# Patient Record
Sex: FEMALE | Race: WHITE | Employment: OTHER | URBAN - METROPOLITAN AREA
[De-identification: names, ages, dates, MRNs, and addresses within clinical notes are randomized per-mention and may not be internally consistent; named-entity substitution may affect disease eponyms.]

---

## 2017-06-22 ENCOUNTER — OP HISTORICAL/CONVERTED ENCOUNTER (OUTPATIENT)
Dept: OTHER | Age: 82
End: 2017-06-22

## 2018-01-11 ENCOUNTER — OP HISTORICAL/CONVERTED ENCOUNTER (OUTPATIENT)
Dept: OTHER | Age: 83
End: 2018-01-11

## 2018-02-26 ENCOUNTER — OP HISTORICAL/CONVERTED ENCOUNTER (OUTPATIENT)
Dept: OTHER | Age: 83
End: 2018-02-26

## 2018-07-05 ENCOUNTER — OP HISTORICAL/CONVERTED ENCOUNTER (OUTPATIENT)
Dept: OTHER | Age: 83
End: 2018-07-05

## 2020-06-08 ENCOUNTER — OP HISTORICAL/CONVERTED ENCOUNTER (OUTPATIENT)
Dept: OTHER | Age: 85
End: 2020-06-08

## 2020-09-23 ENCOUNTER — HOSPITAL ENCOUNTER (OUTPATIENT)
Dept: PREADMISSION TESTING | Age: 85
Discharge: HOME | End: 2020-09-23
Payer: MEDICARE

## 2020-09-23 VITALS
HEART RATE: 69 BPM | RESPIRATION RATE: 16 BRPM | HEIGHT: 61 IN | WEIGHT: 96.2 LBS | DIASTOLIC BLOOD PRESSURE: 88 MMHG | SYSTOLIC BLOOD PRESSURE: 164 MMHG | OXYGEN SATURATION: 94 % | TEMPERATURE: 97.6 F | BODY MASS INDEX: 18.16 KG/M2

## 2020-09-23 LAB
ANION GAP SERPL CALC-SCNC: 6 MMOL/L (ref 5–15)
APTT PPP: 34.8 SEC (ref 23–35.7)
ATRIAL RATE: 60 BPM
BUN SERPL-MCNC: 14 MG/DL (ref 6–20)
BUN/CREAT SERPL: 19 (ref 12–20)
CA-I BLD-MCNC: 9.1 MG/DL (ref 8.5–10.1)
CALCULATED P AXIS, ECG09: 70 DEGREES
CALCULATED R AXIS, ECG10: -61 DEGREES
CALCULATED T AXIS, ECG11: 34 DEGREES
CHLORIDE SERPL-SCNC: 100 MMOL/L (ref 97–108)
CO2 SERPL-SCNC: 33 MMOL/L (ref 21–32)
CREAT SERPL-MCNC: 0.73 MG/DL (ref 0.55–1.02)
DIAGNOSIS, 93000: NORMAL
ERYTHROCYTE [DISTWIDTH] IN BLOOD BY AUTOMATED COUNT: 14.9 % (ref 11.5–14.5)
GLUCOSE SERPL-MCNC: 100 MG/DL (ref 65–100)
HCT VFR BLD AUTO: 40.2 % (ref 35–47)
HGB BLD-MCNC: 12.8 % (ref 11.5–16)
INR PPP: 1 (ref 0.9–1.1)
MCH RBC QN AUTO: 28.1 PG (ref 26–34)
MCHC RBC AUTO-ENTMCNC: 31.8 G/DL (ref 30–36.5)
MCV RBC AUTO: 88.4 FL (ref 80–99)
P-R INTERVAL, ECG05: 198 MS
PLATELET # BLD AUTO: 142 K/UL (ref 150–400)
PMV BLD AUTO: 10.4 FL (ref 8.9–12.9)
POTASSIUM SERPL-SCNC: 4 MMOL/L (ref 3.5–5.1)
PROTHROMBIN TIME: 13.7 SEC (ref 11.9–14.7)
Q-T INTERVAL, ECG07: 460 MS
QRS DURATION, ECG06: 86 MS
QTC CALCULATION (BEZET), ECG08: 460 MS
RBC # BLD AUTO: 4.55 M/UL (ref 3.8–5.2)
SODIUM SERPL-SCNC: 139 MMOL/L (ref 136–145)
THERAPEUTIC RANGE,PTTT: NORMAL SEC (ref 68–109)
VENTRICULAR RATE, ECG03: 60 BPM
WBC # BLD AUTO: 5.1 K/UL (ref 3.6–11)

## 2020-09-23 PROCEDURE — 36415 COLL VENOUS BLD VENIPUNCTURE: CPT

## 2020-09-23 PROCEDURE — 85730 THROMBOPLASTIN TIME PARTIAL: CPT

## 2020-09-23 PROCEDURE — 85610 PROTHROMBIN TIME: CPT

## 2020-09-23 PROCEDURE — 80048 BASIC METABOLIC PNL TOTAL CA: CPT

## 2020-09-23 PROCEDURE — 85027 COMPLETE CBC AUTOMATED: CPT

## 2020-09-23 PROCEDURE — 93005 ELECTROCARDIOGRAM TRACING: CPT

## 2020-09-23 RX ORDER — LANOLIN ALCOHOL/MO/W.PET/CERES
1000 CREAM (GRAM) TOPICAL DAILY
COMMUNITY

## 2020-09-23 RX ORDER — DIAPER,BRIEF,INFANT-TODD,DISP
EACH MISCELLANEOUS 2 TIMES DAILY
COMMUNITY

## 2020-09-23 RX ORDER — METOPROLOL SUCCINATE 25 MG/1
25 TABLET, EXTENDED RELEASE ORAL DAILY
COMMUNITY

## 2020-09-23 RX ORDER — LEVOTHYROXINE SODIUM 25 UG/1
50 TABLET ORAL
COMMUNITY

## 2020-09-23 RX ORDER — ASPIRIN 81 MG/1
81 TABLET ORAL DAILY
COMMUNITY

## 2020-09-23 RX ORDER — PANTOPRAZOLE SODIUM 20 MG/1
20 TABLET, DELAYED RELEASE ORAL DAILY
COMMUNITY

## 2020-09-27 ENCOUNTER — HOSPITAL ENCOUNTER (OUTPATIENT)
Dept: PREADMISSION TESTING | Age: 85
Discharge: HOME | End: 2020-09-27
Payer: MEDICARE

## 2020-09-27 LAB — SARS-COV-2, COV2: NORMAL

## 2020-09-27 PROCEDURE — 87635 SARS-COV-2 COVID-19 AMP PRB: CPT

## 2020-10-01 ENCOUNTER — HOSPITAL ENCOUNTER (OUTPATIENT)
Age: 85
Discharge: HOME | End: 2020-10-01
Attending: INTERNAL MEDICINE | Admitting: INTERNAL MEDICINE
Payer: MEDICARE

## 2020-10-01 VITALS
TEMPERATURE: 97.9 F | SYSTOLIC BLOOD PRESSURE: 130 MMHG | DIASTOLIC BLOOD PRESSURE: 67 MMHG | OXYGEN SATURATION: 96 % | RESPIRATION RATE: 18 BRPM | HEART RATE: 89 BPM

## 2020-10-01 DIAGNOSIS — Z45.010 PACEMAKER GENERATOR END OF LIFE: ICD-10-CM

## 2020-10-01 PROBLEM — Z45.018 ADJUSTMENT AND MANAGEMENT OF CARDIAC PACEMAKER: Status: ACTIVE | Noted: 2020-10-01

## 2020-10-01 LAB — SARS-COV-2, COV2NT: NOT DETECTED

## 2020-10-01 PROCEDURE — C1786 PMKR, SINGLE, RATE-RESP: HCPCS | Performed by: INTERNAL MEDICINE

## 2020-10-01 PROCEDURE — 74011000250 HC RX REV CODE- 250: Performed by: INTERNAL MEDICINE

## 2020-10-01 PROCEDURE — 76210000006 HC OR PH I REC 0.5 TO 1 HR: Performed by: INTERNAL MEDICINE

## 2020-10-01 PROCEDURE — 74011250636 HC RX REV CODE- 250/636: Performed by: INTERNAL MEDICINE

## 2020-10-01 PROCEDURE — 77030022017 HC DRSG HEMO QCLOT ZMED -A: Performed by: INTERNAL MEDICINE

## 2020-10-01 PROCEDURE — 33212 INSERT PULSE GEN SNGL LEAD: CPT | Performed by: INTERNAL MEDICINE

## 2020-10-01 PROCEDURE — 74011000272 HC RX REV CODE- 272: Performed by: INTERNAL MEDICINE

## 2020-10-01 PROCEDURE — 77030028700 HC BLD TISS PLSM MEDT -E: Performed by: INTERNAL MEDICINE

## 2020-10-01 PROCEDURE — 99153 MOD SED SAME PHYS/QHP EA: CPT | Performed by: INTERNAL MEDICINE

## 2020-10-01 PROCEDURE — 77030003028 HC SUT VCRL J&J -A: Performed by: INTERNAL MEDICINE

## 2020-10-01 PROCEDURE — 74011000258 HC RX REV CODE- 258: Performed by: INTERNAL MEDICINE

## 2020-10-01 PROCEDURE — 74011250637 HC RX REV CODE- 250/637: Performed by: INTERNAL MEDICINE

## 2020-10-01 PROCEDURE — 99152 MOD SED SAME PHYS/QHP 5/>YRS: CPT | Performed by: INTERNAL MEDICINE

## 2020-10-01 PROCEDURE — 76210000020 HC REC RM PH II FIRST 0.5 HR: Performed by: INTERNAL MEDICINE

## 2020-10-01 PROCEDURE — 77030031139 HC SUT VCRL2 J&J -A: Performed by: INTERNAL MEDICINE

## 2020-10-01 DEVICE — IMPLANTABLE DEVICE: Type: IMPLANTABLE DEVICE | Status: FUNCTIONAL

## 2020-10-01 RX ORDER — LIDOCAINE HYDROCHLORIDE 20 MG/ML
INJECTION, SOLUTION INFILTRATION; PERINEURAL AS NEEDED
Status: DISCONTINUED | OUTPATIENT
Start: 2020-10-01 | End: 2020-10-01 | Stop reason: HOSPADM

## 2020-10-01 RX ORDER — FENTANYL CITRATE 50 UG/ML
INJECTION, SOLUTION INTRAMUSCULAR; INTRAVENOUS AS NEEDED
Status: DISCONTINUED | OUTPATIENT
Start: 2020-10-01 | End: 2020-10-01 | Stop reason: HOSPADM

## 2020-10-01 RX ORDER — SODIUM CHLORIDE 9 MG/ML
20 INJECTION, SOLUTION INTRAVENOUS CONTINUOUS
Status: DISCONTINUED | OUTPATIENT
Start: 2020-10-01 | End: 2020-10-01 | Stop reason: HOSPADM

## 2020-10-01 RX ORDER — MIDAZOLAM HYDROCHLORIDE 1 MG/ML
INJECTION, SOLUTION INTRAMUSCULAR; INTRAVENOUS AS NEEDED
Status: DISCONTINUED | OUTPATIENT
Start: 2020-10-01 | End: 2020-10-01 | Stop reason: HOSPADM

## 2020-10-01 RX ADMIN — SODIUM CHLORIDE 20 ML/HR: 9 INJECTION, SOLUTION INTRAVENOUS at 08:00

## 2020-10-01 NOTE — DISCHARGE INSTRUCTIONS
Patient Education        Pacemaker or ICD Replacement: What to Expect at [address] [facility]     Pacemaker or implantable cardioverter-defibrillator (ICD) replacement is surgery to put a new heart device in your chest. The battery in your new device is fully charged. It should last for several years. Your doctor may have also replaced the wires (leads) from the device to your heart, if needed. Your chest may be sore where the doctor made the cut (incision) and put in the device. You also may have a bruise and mild swelling. These symptoms usually get better in 1 to 2 weeks. You may feel a hard ridge along the incision. This usually gets softer in the months after surgery. You may be able to see or feel the outline of the device under your skin. You will probably be able to go back to work or your usual routine within 1 week after surgery. It may take as long as 2 weeks if your leads were also replaced. This care sheet gives you a general idea about how long it will take for you to recover. But each person recovers at a different pace. Follow the steps below to get better as quickly as possible. How can you care for yourself at home? Activity    · Rest when you feel tired.     · Be active. Walking is a good choice.     · If you also had your leads replaced, then for about 2 weeks:  ? Avoid activities that strain your chest or upper arm muscles. This includes pushing a  and mopping floors. It also includes swimming and swinging a golf club. ? Do not raise your arm (the one on the side of your body where the device is located) above your shoulder.     · Allow your body to heal. Don't move quickly or lift anything heavy until you are feeling better.     · Ask your doctor when it is okay for you to have sex. Diet    · You can eat your normal diet. If your stomach is upset, try bland, low-fat foods like plain rice, broiled chicken, toast, and yogurt.    Medicines    · Your doctor will tell you if and when you can restart your medicines. He or she will also give you instructions about taking any new medicines.     · If you take aspirin or some other blood thinner, ask your doctor if and when to start taking it again. Make sure that you understand exactly what your doctor wants you to do.     · Be safe with medicines. Read and follow all instructions on the label. ? If the doctor gave you a prescription medicine for pain, take it as prescribed. ? If you are not taking a prescription pain medicine, ask your doctor if you can take an over-the-counter medicine.     · If your doctor prescribed antibiotics, take them as directed. Do not stop taking them just because you feel better. You need to take the full course of antibiotics. Incision care    · If you have strips of tape on the cut (incision) the doctor made, leave the tape on for a week or until it falls off.     · You may shower 24 to 48 hours after surgery. Pat the incision dry. Don't swim or take a bath for the first 2 weeks, or until your doctor tells you it is okay.     · You will have a dressing over the incision. A dressing helps the incision heal and protects it. Your doctor will tell you how to take care of this. Other instructions    · Keep a medical ID card with you at all times that says you have a heart device. You'll get an updated one with information about your new device. The card should include the  and model information.     · Wear medical alert jewelry stating that you have the device. You can buy this at most drugstores.     · Check your pulse as directed by your doctor.     · Have your device checked as often as your doctor recommends. In some cases, this may be done over the phone or online. Your doctor will give you instructions about how to do this. Follow-up care is a key part of your treatment and safety. Be sure to make and go to all appointments, and call your doctor if you are having problems.  It's also a good idea to know your test results and keep a list of the medicines you take. When should you call for help? Call 911 anytime you think you may need emergency care. For example, call if:    · You passed out (lost consciousness).     · You have trouble breathing. Call your doctor now or seek immediate medical care if:    · You are dizzy or lightheaded, or you feel like you may faint.     · You have pain that does not get better after you take pain medicine.     · You hear an alarm or feel a vibration from your heart device.     · You have loose stitches, or your incision comes open.     · Bright red blood has soaked through the bandage over your incision.     · You have signs of infection, such as:  ? Increased pain, swelling, warmth, or redness. ? Red streaks leading from the incision. ? Pus draining from the incision. ? A fever. Watch closely for changes in your health, and be sure to contact your doctor if:    · You have any problems with your device. Current as of: December 16, 2019               Content Version: 12.6  © 6191-4978 [employer]. Care instructions adapted under license by [employer] (which disclaims liability or warranty for this information). If you have questions about a medical condition or this instruction, always ask your healthcare professional. [place] [address] any warranty or liability for your use of this information. Patient Education        Pacemaker or ICD Replacement: What to Expect at [address] [place] [address]     Pacemaker or implantable cardioverter-defibrillator (ICD) replacement is surgery to put a new heart device in your chest. The battery in your new device is fully charged. It should last for several years. Your doctor may have also replaced the wires (leads) from the device to your heart, if needed. Your chest may be sore where the doctor made the cut (incision) and put in the device. You also may have a bruise and mild swelling. These symptoms usually get better in 1 to 2 weeks. You may feel a hard ridge along the incision. This usually gets softer in the months after surgery. You may be able to see or feel the outline of the device under your skin. You will probably be able to go back to work or your usual routine within 1 week after surgery. It may take as long as 2 weeks if your leads were also replaced. This care sheet gives you a general idea about how long it will take for you to recover. But each person recovers at a different pace. Follow the steps below to get better as quickly as possible. How can you care for yourself at home? Activity    · Rest when you feel tired.     · Be active. Walking is a good choice.     · If you also had your leads replaced, then for about 2 weeks:  ? Avoid activities that strain your chest or upper arm muscles. This includes pushing a  and mopping floors. It also includes swimming and swinging a golf club. ? Do not raise your arm (the one on the side of your body where the device is located) above your shoulder.     · Allow your body to heal. Don't move quickly or lift anything heavy until you are feeling better.     · Ask your doctor when it is okay for you to have sex. Diet    · You can eat your normal diet. If your stomach is upset, try bland, low-fat foods like plain rice, broiled chicken, toast, and yogurt. Medicines    · Your doctor will tell you if and when you can restart your medicines. He or she will also give you instructions about taking any new medicines.     · If you take aspirin or some other blood thinner, ask your doctor if and when to start taking it again. Make sure that you understand exactly what your doctor wants you to do.     · Be safe with medicines. Read and follow all instructions on the label. ? If the doctor gave you a prescription medicine for pain, take it as prescribed.   ? If you are not taking a prescription pain medicine, ask your doctor if you can take an over-the-counter medicine.     · If your doctor prescribed antibiotics, take them as directed. Do not stop taking them just because you feel better. You need to take the full course of antibiotics. Incision care    · If you have strips of tape on the cut (incision) the doctor made, leave the tape on for a week or until it falls off.     · You may shower 24 to 48 hours after surgery. Pat the incision dry. Don't swim or take a bath for the first 2 weeks, or until your doctor tells you it is okay.     · You will have a dressing over the incision. A dressing helps the incision heal and protects it. Your doctor will tell you how to take care of this. Other instructions    · Keep a medical ID card with you at all times that says you have a heart device. You'll get an updated one with information about your new device. The card should include the  and model information.     · Wear medical alert jewelry stating that you have the device. You can buy this at most drugstores.     · Check your pulse as directed by your doctor.     · Have your device checked as often as your doctor recommends. In some cases, this may be done over the phone or online. Your doctor will give you instructions about how to do this. Follow-up care is a key part of your treatment and safety. Be sure to make and go to all appointments, and call your doctor if you are having problems. It's also a good idea to know your test results and keep a list of the medicines you take. When should you call for help? Call 911 anytime you think you may need emergency care. For example, call if:    · You passed out (lost consciousness).     · You have trouble breathing.    Call your doctor now or seek immediate medical care if:    · You are dizzy or lightheaded, or you feel like you may faint.     · You have pain that does not get better after you take pain medicine.     · You hear an alarm or feel a vibration from your heart device.     · You have loose stitches, or your incision comes open.     · Bright red blood has soaked through the bandage over your incision.     · You have signs of infection, such as:  ? Increased pain, swelling, warmth, or redness. ? Red streaks leading from the incision. ? Pus draining from the incision. ? A fever. Watch closely for changes in your health, and be sure to contact your doctor if:    · You have any problems with your device. Current as of: December 16, 2019               Content Version: 12.6  © 2006-2020 [employer]. Care instructions adapted under license by [employer] (which disclaims liability or warranty for this information). If you have questions about a medical condition or this instruction, always ask your healthcare professional. [address] any warranty or liability for your use of this information.

## 2020-10-01 NOTE — PROCEDURES
NAME: Yessenia Gordon   :  9/3/1919   MRN:  [id number]   Date/Time:  10/1/2020 10:42 AM        Indication: Pacemaker at [employer] replacement    PROCEDURES PERFORMED:  1. Conscious sedation. 2. REMOVAL OF Cardiac Rhythm Device Betsy Early [id number] (DOI, 1-)  3. Permanent [facility] [id number], [id number] Device  Implantation:        COMPLICATIONS:None. ESTIMATED BLOOD LOSS: Minimal  SPECIMENS: None  ASSISTANTS: See Gaby    PROCEDURAL NOTE:  The patient was brought to the laboratory in a sedated postabsorptive state. Conscious sedation was administered by nursing staff under my supervision. The left chest wall was prepped and draped in the usual fashion and anesthetized with 20 mL of 2% lidocaine. Incision was made over the deltopectoral groove and extended to the level of the pectoralis fascia. A pocket was opened and the cardiac rhythm device was exposed and removed. The leads were inspected and noted to be visually intact. The leads were tested and thresholds were acceptable. The device pocket was flushed with antibiotic containing sterile saline  solution. The leads were then attached to generator. Leads and generator placed in the pocket with the leads posterior to the generator. Subcutaneous tissue closed in 2 layers utilizing #2-0 Vicryl. Skin closed with sterisrtips with an excellent final result. The patient was transferred to the holding area    No complications were noted. Procedure Time: 10 AM  -10.45AM    Anesthesia time: 10.10 AM-10.45 AM, IV Versed and Fentanyl used under my supervision    PACEMAKER THRESHOLD TESTIN. Ventricular R-wave:12.5millivolts; Ventricular capture threshold ( 0.4 milliseconds pulse width): Voltage ). 75 volts, impedance 626 ohms,  Atrial P-wave: 3.5 millivolts;  Atrial capture threshold ( 0.4milliseconds pulse width): Voltage 0.5 volts, impedance 560 ohms,

## 2020-10-01 NOTE — Clinical Note
A Bovie was used. Blend setting: blend. Coagulation Settin. Cut Settin. Site (pad location): upper leg. Laterality: right.

## 2020-10-01 NOTE — PROGRESS NOTES
Patient and daughter verbalized understanding of discharge instructions. Patient ambulating with cane. Sling applied to left arm. Nitro-pasted removed. Discharged via wheelchair.

## 2020-10-01 NOTE — Clinical Note
Contrast Dose Calculator:   Patient's age: 80. Patient's sex: Female. Patient weight (kg) = 46. Creatinine level (mg/dL) = 0.76. Creatinine clearance (mL/min): 28.   Max Contrast dose per Creatinine Cl calculator = 63 mL.

## 2020-10-01 NOTE — ROUTINE PROCESS
Dr. Mica Styles was notified that Bacitracin irrigation no longer available, EP lab notified also. Per pharmacy no longer available and this is per new recommendations.

## 2022-04-22 ENCOUNTER — HOSPITAL ENCOUNTER (INPATIENT)
Age: 87
LOS: 3 days | Discharge: HOME | DRG: 280 | End: 2022-04-25
Attending: EMERGENCY MEDICINE | Admitting: INTERNAL MEDICINE
Payer: MEDICARE

## 2022-04-22 ENCOUNTER — APPOINTMENT (OUTPATIENT)
Dept: GENERAL RADIOLOGY | Age: 87
DRG: 280 | End: 2022-04-22
Attending: EMERGENCY MEDICINE
Payer: MEDICARE

## 2022-04-22 DIAGNOSIS — I48.91 ATRIAL FIBRILLATION WITH RAPID VENTRICULAR RESPONSE (HCC): Primary | ICD-10-CM

## 2022-04-22 DIAGNOSIS — I50.9 CONGESTIVE HEART FAILURE, UNSPECIFIED HF CHRONICITY, UNSPECIFIED HEART FAILURE TYPE (HCC): ICD-10-CM

## 2022-04-22 DIAGNOSIS — R77.8 ELEVATED TROPONIN: ICD-10-CM

## 2022-04-22 LAB
ALBUMIN SERPL-MCNC: 3.9 G/DL (ref 3.5–5)
ALBUMIN/GLOB SERPL: 1.1 {RATIO} (ref 1.1–2.2)
ALP SERPL-CCNC: 76 U/L (ref 45–117)
ALT SERPL-CCNC: 51 U/L (ref 12–78)
ANION GAP SERPL CALC-SCNC: 3 MMOL/L (ref 5–15)
APTT PPP: 33.6 SEC (ref 21.2–34.1)
AST SERPL W P-5'-P-CCNC: 54 U/L (ref 15–37)
ATRIAL RATE: 159 BPM
ATRIAL RATE: 278 BPM
BASOPHILS # BLD: 0 K/UL (ref 0–0.1)
BASOPHILS NFR BLD: 0 % (ref 0–1)
BILIRUB SERPL-MCNC: 1.1 MG/DL (ref 0.2–1)
BNP SERPL-MCNC: ABNORMAL PG/ML
BUN SERPL-MCNC: 22 MG/DL (ref 6–20)
BUN/CREAT SERPL: 27 (ref 12–20)
CA-I BLD-MCNC: 9.3 MG/DL (ref 8.5–10.1)
CALCULATED R AXIS, ECG10: -58 DEGREES
CALCULATED R AXIS, ECG10: -70 DEGREES
CALCULATED T AXIS, ECG11: -74 DEGREES
CALCULATED T AXIS, ECG11: 88 DEGREES
CHLORIDE SERPL-SCNC: 103 MMOL/L (ref 97–108)
CO2 SERPL-SCNC: 30 MMOL/L (ref 21–32)
CREAT SERPL-MCNC: 0.83 MG/DL (ref 0.55–1.02)
DIAGNOSIS, 93000: NORMAL
DIAGNOSIS, 93000: NORMAL
DIFFERENTIAL METHOD BLD: ABNORMAL
EOSINOPHIL # BLD: 0 K/UL (ref 0–0.4)
EOSINOPHIL NFR BLD: 0 % (ref 0–7)
ERYTHROCYTE [DISTWIDTH] IN BLOOD BY AUTOMATED COUNT: 13.5 % (ref 11.5–14.5)
GLOBULIN SER CALC-MCNC: 3.4 G/DL (ref 2–4)
GLUCOSE SERPL-MCNC: 138 MG/DL (ref 65–100)
HCT VFR BLD AUTO: 41.1 % (ref 35–47)
HGB BLD-MCNC: 13.5 G/DL (ref 11.5–16)
IMM GRANULOCYTES # BLD AUTO: 0.1 K/UL (ref 0–0.04)
IMM GRANULOCYTES NFR BLD AUTO: 1 % (ref 0–0.5)
INR PPP: 1.1 (ref 0.9–1.1)
LACTATE SERPL-SCNC: 2 MMOL/L (ref 0.4–2)
LYMPHOCYTES # BLD: 0.4 K/UL (ref 0.8–3.5)
LYMPHOCYTES NFR BLD: 3 % (ref 12–49)
MCH RBC QN AUTO: 30.8 PG (ref 26–34)
MCHC RBC AUTO-ENTMCNC: 32.8 G/DL (ref 30–36.5)
MCV RBC AUTO: 93.6 FL (ref 80–99)
MONOCYTES # BLD: 1.6 K/UL (ref 0–1)
MONOCYTES NFR BLD: 13 % (ref 5–13)
NEUTS SEG # BLD: 10.3 K/UL (ref 1.8–8)
NEUTS SEG NFR BLD: 83 % (ref 32–75)
NRBC # BLD: 0 K/UL (ref 0–0.01)
NRBC BLD-RTO: 0 PER 100 WBC
PLATELET # BLD AUTO: 169 K/UL (ref 150–400)
PMV BLD AUTO: 11.6 FL (ref 8.9–12.9)
POTASSIUM SERPL-SCNC: 4.6 MMOL/L (ref 3.5–5.1)
PROT SERPL-MCNC: 7.3 G/DL (ref 6.4–8.2)
PROTHROMBIN TIME: 14.5 SEC (ref 11.9–14.6)
Q-T INTERVAL, ECG07: 314 MS
Q-T INTERVAL, ECG07: 382 MS
QRS DURATION, ECG06: 86 MS
QRS DURATION, ECG06: 90 MS
QTC CALCULATION (BEZET), ECG08: 485 MS
QTC CALCULATION (BEZET), ECG08: 494 MS
RBC # BLD AUTO: 4.39 M/UL (ref 3.8–5.2)
SODIUM SERPL-SCNC: 136 MMOL/L (ref 136–145)
THERAPEUTIC RANGE,PTTT: NORMAL SEC (ref 82–109)
TROPONIN-HIGH SENSITIVITY: 157 NG/L (ref 0–51)
TROPONIN-HIGH SENSITIVITY: 159 NG/L (ref 0–51)
VENTRICULAR RATE, ECG03: 149 BPM
VENTRICULAR RATE, ECG03: 97 BPM
WBC # BLD AUTO: 12.4 K/UL (ref 3.6–11)

## 2022-04-22 PROCEDURE — 74011250637 HC RX REV CODE- 250/637: Performed by: INTERNAL MEDICINE

## 2022-04-22 PROCEDURE — 36415 COLL VENOUS BLD VENIPUNCTURE: CPT

## 2022-04-22 PROCEDURE — 83605 ASSAY OF LACTIC ACID: CPT

## 2022-04-22 PROCEDURE — 74011000250 HC RX REV CODE- 250: Performed by: EMERGENCY MEDICINE

## 2022-04-22 PROCEDURE — 85730 THROMBOPLASTIN TIME PARTIAL: CPT

## 2022-04-22 PROCEDURE — 96375 TX/PRO/DX INJ NEW DRUG ADDON: CPT

## 2022-04-22 PROCEDURE — 99285 EMERGENCY DEPT VISIT HI MDM: CPT

## 2022-04-22 PROCEDURE — 80053 COMPREHEN METABOLIC PANEL: CPT

## 2022-04-22 PROCEDURE — 85025 COMPLETE CBC W/AUTO DIFF WBC: CPT

## 2022-04-22 PROCEDURE — 87040 BLOOD CULTURE FOR BACTERIA: CPT

## 2022-04-22 PROCEDURE — 71045 X-RAY EXAM CHEST 1 VIEW: CPT

## 2022-04-22 PROCEDURE — 74011000250 HC RX REV CODE- 250: Performed by: INTERNAL MEDICINE

## 2022-04-22 PROCEDURE — 74011250636 HC RX REV CODE- 250/636: Performed by: EMERGENCY MEDICINE

## 2022-04-22 PROCEDURE — 83880 ASSAY OF NATRIURETIC PEPTIDE: CPT

## 2022-04-22 PROCEDURE — 93005 ELECTROCARDIOGRAM TRACING: CPT

## 2022-04-22 PROCEDURE — 84484 ASSAY OF TROPONIN QUANT: CPT

## 2022-04-22 PROCEDURE — 96374 THER/PROPH/DIAG INJ IV PUSH: CPT

## 2022-04-22 PROCEDURE — 65270000029 HC RM PRIVATE

## 2022-04-22 PROCEDURE — 85610 PROTHROMBIN TIME: CPT

## 2022-04-22 RX ORDER — HEPARIN SODIUM 1000 [USP'U]/ML
60 INJECTION, SOLUTION INTRAVENOUS; SUBCUTANEOUS AS NEEDED
Status: DISCONTINUED | OUTPATIENT
Start: 2022-04-22 | End: 2022-04-23

## 2022-04-22 RX ORDER — FUROSEMIDE 10 MG/ML
40 INJECTION INTRAMUSCULAR; INTRAVENOUS
Status: COMPLETED | OUTPATIENT
Start: 2022-04-22 | End: 2022-04-22

## 2022-04-22 RX ORDER — SODIUM CHLORIDE 0.9 % (FLUSH) 0.9 %
5-40 SYRINGE (ML) INJECTION EVERY 8 HOURS
Status: DISCONTINUED | OUTPATIENT
Start: 2022-04-22 | End: 2022-04-25 | Stop reason: HOSPADM

## 2022-04-22 RX ORDER — HEPARIN SODIUM 1000 [USP'U]/ML
30 INJECTION, SOLUTION INTRAVENOUS; SUBCUTANEOUS AS NEEDED
Status: DISCONTINUED | OUTPATIENT
Start: 2022-04-22 | End: 2022-04-23

## 2022-04-22 RX ORDER — LEVOTHYROXINE SODIUM 25 UG/1
50 TABLET ORAL
Status: DISCONTINUED | OUTPATIENT
Start: 2022-04-23 | End: 2022-04-25 | Stop reason: HOSPADM

## 2022-04-22 RX ORDER — ACETAMINOPHEN 650 MG/1
650 SUPPOSITORY RECTAL
Status: DISCONTINUED | OUTPATIENT
Start: 2022-04-22 | End: 2022-04-25 | Stop reason: HOSPADM

## 2022-04-22 RX ORDER — LANOLIN ALCOHOL/MO/W.PET/CERES
1000 CREAM (GRAM) TOPICAL DAILY
Status: DISCONTINUED | OUTPATIENT
Start: 2022-04-23 | End: 2022-04-25 | Stop reason: HOSPADM

## 2022-04-22 RX ORDER — ONDANSETRON 2 MG/ML
4 INJECTION INTRAMUSCULAR; INTRAVENOUS
Status: DISCONTINUED | OUTPATIENT
Start: 2022-04-22 | End: 2022-04-25 | Stop reason: HOSPADM

## 2022-04-22 RX ORDER — PANTOPRAZOLE SODIUM 20 MG/1
20 TABLET, DELAYED RELEASE ORAL DAILY
Status: DISCONTINUED | OUTPATIENT
Start: 2022-04-23 | End: 2022-04-25 | Stop reason: HOSPADM

## 2022-04-22 RX ORDER — HYDROCORTISONE 10 MG/ML
1 LOTION TOPICAL 2 TIMES DAILY
Status: DISCONTINUED | OUTPATIENT
Start: 2022-04-22 | End: 2022-04-25 | Stop reason: HOSPADM

## 2022-04-22 RX ORDER — LORATADINE 10 MG/1
10 TABLET ORAL DAILY
Status: DISCONTINUED | OUTPATIENT
Start: 2022-04-23 | End: 2022-04-25 | Stop reason: HOSPADM

## 2022-04-22 RX ORDER — ASPIRIN 81 MG/1
81 TABLET ORAL DAILY
Status: DISCONTINUED | OUTPATIENT
Start: 2022-04-23 | End: 2022-04-23

## 2022-04-22 RX ORDER — FUROSEMIDE 10 MG/ML
40 INJECTION INTRAMUSCULAR; INTRAVENOUS DAILY
Status: DISCONTINUED | OUTPATIENT
Start: 2022-04-23 | End: 2022-04-23

## 2022-04-22 RX ORDER — DILTIAZEM HCL/D5W 125 MG/125
10 PLASTIC BAG, INJECTION (ML) INTRAVENOUS
Status: DISCONTINUED | OUTPATIENT
Start: 2022-04-22 | End: 2022-04-23

## 2022-04-22 RX ORDER — POLYETHYLENE GLYCOL 3350 17 G/17G
17 POWDER, FOR SOLUTION ORAL DAILY PRN
Status: DISCONTINUED | OUTPATIENT
Start: 2022-04-22 | End: 2022-04-25 | Stop reason: HOSPADM

## 2022-04-22 RX ORDER — ACETAMINOPHEN 325 MG/1
650 TABLET ORAL
Status: DISCONTINUED | OUTPATIENT
Start: 2022-04-22 | End: 2022-04-25 | Stop reason: HOSPADM

## 2022-04-22 RX ORDER — ONDANSETRON 4 MG/1
4 TABLET, ORALLY DISINTEGRATING ORAL
Status: DISCONTINUED | OUTPATIENT
Start: 2022-04-22 | End: 2022-04-25 | Stop reason: HOSPADM

## 2022-04-22 RX ORDER — DILTIAZEM HYDROCHLORIDE 5 MG/ML
10 INJECTION INTRAVENOUS
Status: COMPLETED | OUTPATIENT
Start: 2022-04-22 | End: 2022-04-22

## 2022-04-22 RX ORDER — METOPROLOL SUCCINATE 25 MG/1
25 TABLET, EXTENDED RELEASE ORAL DAILY
Status: DISCONTINUED | OUTPATIENT
Start: 2022-04-23 | End: 2022-04-25 | Stop reason: HOSPADM

## 2022-04-22 RX ORDER — HEPARIN SODIUM 10000 [USP'U]/100ML
12-25 INJECTION, SOLUTION INTRAVENOUS CONTINUOUS
Status: DISCONTINUED | OUTPATIENT
Start: 2022-04-22 | End: 2022-04-23

## 2022-04-22 RX ORDER — ENOXAPARIN SODIUM 100 MG/ML
30 INJECTION SUBCUTANEOUS DAILY
Status: DISCONTINUED | OUTPATIENT
Start: 2022-04-23 | End: 2022-04-22

## 2022-04-22 RX ORDER — SODIUM CHLORIDE 0.9 % (FLUSH) 0.9 %
5-40 SYRINGE (ML) INJECTION AS NEEDED
Status: DISCONTINUED | OUTPATIENT
Start: 2022-04-22 | End: 2022-04-25 | Stop reason: HOSPADM

## 2022-04-22 RX ADMIN — Medication 5 MG/HR: at 11:37

## 2022-04-22 RX ADMIN — DILTIAZEM HYDROCHLORIDE 10 MG: 5 INJECTION INTRAVENOUS at 11:32

## 2022-04-22 RX ADMIN — CEFTRIAXONE SODIUM 1 G: 1 INJECTION, POWDER, FOR SOLUTION INTRAMUSCULAR; INTRAVENOUS at 11:43

## 2022-04-22 RX ADMIN — ACETAMINOPHEN 650 MG: 325 TABLET ORAL at 22:47

## 2022-04-22 RX ADMIN — SODIUM CHLORIDE, PRESERVATIVE FREE 10 ML: 5 INJECTION INTRAVENOUS at 15:42

## 2022-04-22 RX ADMIN — FUROSEMIDE 40 MG: 10 INJECTION INTRAMUSCULAR; INTRAVENOUS at 12:48

## 2022-04-22 RX ADMIN — SODIUM CHLORIDE, PRESERVATIVE FREE 10 ML: 5 INJECTION INTRAVENOUS at 21:48

## 2022-04-22 NOTE — PROGRESS NOTES
Reason for Admission:  SOB                     RUR Score:   N/A                  Plan for utilizing home health:    Not at this time      PCP: First and Last name:  Jonathon Jacobo MD     Name of Practice:    Are you a current patient: Yes/No:    Approximate date of last visit:  Few months ago   Can you participate in a virtual visit with your PCP:                     Current Advanced Directive/Advance Care Plan: DNR      Healthcare Decision Maker:   Click here to complete [place] including selection of the Healthcare Decision Maker Relationship (ie \"Primary\")     Sharlene Hoff, daughter, [phone]                        Transition of Care Plan:      Met f/f with Pt, she confirmed that the information on the face sheet is correct. Pt stated that she lives by herself. Pt daughter stated that she has people in her house all the time. Pt stated that she has a cane, no HH and is independent with ADL. Pt stated that she uses [facility]. [facility] Muhammad for her Pharmacy. Pt stated that one of her kids will give her a ride home when she is D/C.     CM DISPO: Home maybe with HH, TBD

## 2022-04-22 NOTE — H&P
History & Physical    Primary Care Provider: Kenia Anderson MD  Source of Information: Patient     History of Presenting Illness:   Jose J Serrano is a 80 y.o. female who presents with complaints of progressive shortness of breath for the past few days. Notes intermittent chest palpitation without dizziness lightheadedness or falls. Evaluated in the ED and noted with atrial fibrillation with rapid ventricular response. Started on IV Cardizem with improvement in heart rate. BNP was 16,000. Chest x-ray with pulmonary edema. Will be admitted for further cardiovascular evaluation. Review of Systems:  A comprehensive review of systems was negative except for that written in the History of Present Illness. Past Medical History:   Diagnosis Date    Anxiety     Breast cancer ([facility] [address] 1980    right mastectomy    GI bleed     High cholesterol     Hypertension     Hypothyroid     Pacemaker     Sick sinus syndrome ([facility] [address]     [facility]roke [facility])       Past Surgical History:   Procedure Laterality Date    HX COLONOSCOPY      HX ENDOSCOPY      HX HEMORRHOIDECTOMY      HX HYSTERECTOMY      HX PACEMAKER      HX PACEMAKER PLACEMENT      NE BREAST SURGERY PROCEDURE UNLISTED      right mastectomy    NE INS PM PLS GEN W/EXIST SINGLE LEAD N/A 10/1/2020    INSERT PPM SINGLE LEAD GEN ONLY performed by Flavia Taylor MD at [address] [place] [address]     Prior to Admission medications    Medication Sig Start Date End Date Taking? Authorizing Provider   hydrocortisone (CORTAID) 0.5 % topical cream Apply  to affected area two (2) times a day. use thin layer    Provider, Historical   loratadine 10 mg cap Take  by mouth. Provider, Historical   pantoprazole (PROTONIX) 20 mg tablet Take 20 mg by mouth daily. Provider, Historical   cyanocobalamin 1,000 mcg tablet Take 1,000 mcg by mouth daily.     Provider, Historical   metoprolol succinate (TOPROL-XL) 25 mg XL tablet Take 25 mg by mouth daily. Provider, Historical   aspirin delayed-release 81 mg tablet Take 81 mg by mouth daily. Provider, Historical   levothyroxine (synthroid) 25 mcg tablet Take 50 mcg by mouth Daily (before breakfast). Provider, Historical     No Known Allergies   History reviewed. No pertinent family history. SOCIAL HISTORY:  Patient resides:  Independently    Assisted Living    SNF    With family care x      Smoking history:   None x   Former    Chronic      Alcohol history:   None x   Social    Chronic      Ambulates:   Independently    w/cane x   w/walker    w/wc    CODE STATUS:  DNR    Full x   Other      Objective:     Physical Exam:     Visit Vitals  BP (!) 134/90   Pulse 98   Temp 97.5 °F (36.4 °C)   Resp 21   Ht 5' 3\" (1.6 m)   Wt 45.4 kg (100 lb)   SpO2 93%   BMI 17.71 kg/m²    O2 Flow Rate (L/min): 2 l/min O2 Device: Nasal cannula    General:  Alert, cooperative, no distress, appears stated age. Head:  Normocephalic, without obvious abnormality, atraumatic. Eyes:  Conjunctivae/corneas clear. PERRL, EOMs intact. Nose: Nares normal. Septum midline. Mucosa normal. No drainage or sinus tenderness. Throat: Lips, mucosa, and tongue normal. Teeth and gums normal.   Neck: Supple, symmetrical, trachea midline, no adenopathy, thyroid: no enlargement/tenderness/nodules, no carotid bruit and no JVD. Back:   Symmetric, no curvature. ROM normal. No CVA tenderness. Lungs:   Clear to auscultation bilaterally. Chest wall:  No tenderness or deformity. Heart:  IRRegular rate and rhythm, S1, S2 normal, no murmur, click, rub or gallop. Abdomen:   Soft, non-tender. Bowel sounds normal. No masses,  No organomegaly. Extremities: Extremities normal, atraumatic, no cyanosis or edema. Pulses: 2+ and symmetric all extremities. Skin: Skin color, texture, turgor normal. No rashes or lesions   Neurologic: CNII-XII intact. No motor or sensory deficits.           EKG:  atrial fibrillation, rate 130 bpm.      Data Review:     Recent Days:  Recent Labs     04/22/22  1053   WBC 12.4*   HGB 13.5   HCT 41.1        Recent Labs     04/22/22  1053      K 4.6      CO2 30   *   BUN 22*   CREA 0.83   CA 9.3   ALB 3.9   TBILI 1.1*   ALT 51     No results for input(s): PH, PCO2, PO2, HCO3, FIO2 in the last 72 hours. 24 Hour Results:  Recent Results (from the past 24 hour(s))   EKG, 12 LEAD, INITIAL    Collection Time: 04/22/22 10:33 AM   Result Value Ref Range    Ventricular Rate 149 BPM    Atrial Rate 159 BPM    QRS Duration 90 ms    Q-T Interval 314 ms    QTC Calculation (Bezet) 494 ms    Calculated R Axis -58 degrees    Calculated T Axis 88 degrees    Diagnosis       Atrial fibrillation with rapid ventricular response  RSR' or QR pattern in V1 suggests right ventricular conduction delay  Left anterior fascicular block  Septal infarct , age undetermined  ST & T wave abnormality, consider lateral ischemia  Abnormal ECG  No previous ECGs available  Confirmed by Nara Castrejon M.D., Wendell Gore ([id number]) on 4/22/2022 12:53:02 PM     CBC WITH AUTOMATED DIFF    Collection Time: 04/22/22 10:53 AM   Result Value Ref Range    WBC 12.4 (H) 3.6 - 11.0 K/uL    RBC 4.39 3.80 - 5.20 M/uL    HGB 13.5 11.5 - 16.0 g/dL    HCT 41.1 35.0 - 47.0 %    MCV 93.6 80.0 - 99.0 FL    MCH 30.8 26.0 - 34.0 PG    MCHC 32.8 30.0 - 36.5 g/dL    RDW 13.5 11.5 - 14.5 %    PLATELET 257 902 - 950 K/uL    MPV 11.6 8.9 - 12.9 FL    NRBC 0.0 0.0  WBC    ABSOLUTE NRBC 0.00 0.00 - 0.01 K/uL    NEUTROPHILS 83 (H) 32 - 75 %    LYMPHOCYTES 3 (L) 12 - 49 %    MONOCYTES 13 5 - 13 %    EOSINOPHILS 0 0 - 7 %    BASOPHILS 0 0 - 1 %    IMMATURE GRANULOCYTES 1 (H) 0 - 0.5 %    ABS. NEUTROPHILS 10.3 (H) 1.8 - 8.0 K/UL    ABS. LYMPHOCYTES 0.4 (L) 0.8 - 3.5 K/UL    ABS. MONOCYTES 1.6 (H) 0.0 - 1.0 K/UL    ABS. EOSINOPHILS 0.0 0.0 - 0.4 K/UL    ABS. BASOPHILS 0.0 0.0 - 0.1 K/UL    ABS. IMM.  GRANS. 0.1 (H) 0.00 - 0.04 K/UL    DF AUTOMATED METABOLIC PANEL, COMPREHENSIVE    Collection Time: 04/22/22 10:53 AM   Result Value Ref Range    Sodium 136 136 - 145 mmol/L    Potassium 4.6 3.5 - 5.1 mmol/L    Chloride 103 97 - 108 mmol/L    CO2 30 21 - 32 mmol/L    Anion gap 3 (L) 5 - 15 mmol/L    Glucose 138 (H) 65 - 100 mg/dL    BUN 22 (H) 6 - 20 mg/dL    Creatinine 0.83 0.55 - 1.02 mg/dL    BUN/Creatinine ratio 27 (H) 12 - 20      GFR est AA >60 >60 ml/min/1.73m2    GFR est non-AA >60 >60 ml/min/1.73m2    Calcium 9.3 8.5 - 10.1 mg/dL    Bilirubin, total 1.1 (H) 0.2 - 1.0 mg/dL    AST (SGOT) 54 (H) 15 - 37 U/L    ALT (SGPT) 51 12 - 78 U/L    Alk. phosphatase 76 45 - 117 U/L    Protein, total 7.3 6.4 - 8.2 g/dL    Albumin 3.9 3.5 - 5.0 g/dL    Globulin 3.4 2.0 - 4.0 g/dL    A-G Ratio 1.1 1.1 - 2.2     NT-PRO BNP    Collection Time: 04/22/22 10:53 AM   Result Value Ref Range    NT pro-BNP 16,749 (H) <450 pg/mL   TROPONIN-HIGH SENSITIVITY    Collection Time: 04/22/22 10:54 AM   Result Value Ref Range    Troponin-High Sensitivity 157 (HH) 0 - 51 ng/L   LACTIC ACID    Collection Time: 04/22/22 11:12 AM   Result Value Ref Range    Lactic acid 2.0 0.4 - 2.0 mmol/L   TROPONIN-HIGH SENSITIVITY    Collection Time: 04/22/22 12:38 PM   Result Value Ref Range    Troponin-High Sensitivity 159 (HH) 0 - 51 ng/L   EKG, 12 LEAD, SUBSEQUENT    Collection Time: 04/22/22 12:41 PM   Result Value Ref Range    Ventricular Rate 97 BPM    Atrial Rate 278 BPM    QRS Duration 86 ms    Q-T Interval 382 ms    QTC Calculation (Bezet) 485 ms    Calculated R Axis -70 degrees    Calculated T Axis -74 degrees    Diagnosis       Atrial flutter with variable A-V block  Left axis deviation  Minimal voltage criteria for LVH, may be normal variant  Anterior infarct (cited on or before 22-APR-2022)  ST & T wave abnormality, consider lateral ischemia  When compared with ECG of 22-APR-2022 10:33, (Unconfirmed)  Atrial flutter has replaced Atrial fibrillation  Vent.  rate has decreased BY  52 BPM  Serial changes of Anterior infarct Present  Confirmed by Jason Maloney M.D., Christie Kidd ([id number]) on 4/22/2022 12:53:40 PM           Imaging:   XR CHEST SNGL V   Final Result   Hydrostatic edema suspected. Assessment:     Atrial fibrillation with rapid ventricular response  Probably new onset heart failure  History of GI bleed        Plan:     Admit to telemetry floor inpatient  Continue IV Cardizem at 5 mg/h  Begin Lasix 40 mg IV daily  Urine output.   Fluid restriction to 1500 mL daily  Counseled on low-salt diet at home  Goals Of care discussed with daughter and kids on the phone  No anticoagulation due to prior history of GI bleed  Obtain 2D echocardiogram/cardiology evaluation  Anticipate 24 to 48 hours of in-hospital stay for treatment  DNR by request    Signed By: Norma Georges MD     April 22, 2022

## 2022-04-22 NOTE — ED NOTES
.. TRANSFER - OUT REPORT:    Verbal report given to Zuri on [address]  being transferred to [employer] for ordered procedure       Report consisted of patients Situation, Background, Assessment and   Recommendations(SBAR). Information from the following report(s) SBAR was reviewed with the receiving nurse. Lines:   Peripheral IV 04/22/22 Left Antecubital (Active)       Peripheral IV 04/22/22 Anterior;Proximal;Right Forearm (Active)        Opportunity for questions and clarification was provided.       Patient transported with:   Registered Nurse

## 2022-04-22 NOTE — ED PROVIDER NOTES
EMERGENCY DEPARTMENT HISTORY AND PHYSICAL EXAM      Date: 4/22/2022  Patient Name: Theron Powell    History of Presenting Illness     Chief Complaint   Patient presents with    Shortness of Breath    Chest Pain       History Provided By: Patient and EMS    HPI: Theron Powell, 80 y.o. female with a past medical history significant hypertension, hyperlipidemia and stroke presents to the ED with chief complaint of Shortness of Breath and Chest Pain  . 8-year-old presents with shortness of breath chest pressure. Heart rate going fast.  Unaware that she had atrial fibrillation. Unclear when this started. Chart with her epigastric chest pain yesterday. Pain does not radiate to the back. No nausea or vomiting. No black or bloody stool. Not on anticoagulation. There are no other complaints, changes, or physical findings at this time. PCP: Bruno Cain MD    Current Facility-Administered Medications   Medication Dose Route Frequency Provider Last Rate Last Admin    dilTIAZem (CARDIZEM) 125 mg/125 mL (1 mg/mL) dextrose 5% infusion  5 mg/hr IntraVENous TITRATE Elidia Simental MD 5 mL/hr at 04/22/22 1137 5 mg/hr at 04/22/22 1137    furosemide (LASIX) injection 40 mg  40 mg IntraVENous NOW Polina Jennings MD         Current Outpatient Medications   Medication Sig Dispense Refill    hydrocortisone (CORTAID) 0.5 % topical cream Apply  to affected area two (2) times a day. use thin layer      loratadine 10 mg cap Take  by mouth.  pantoprazole (PROTONIX) 20 mg tablet Take 20 mg by mouth daily.  cyanocobalamin 1,000 mcg tablet Take 1,000 mcg by mouth daily.  metoprolol succinate (TOPROL-XL) 25 mg XL tablet Take 25 mg by mouth daily.  aspirin delayed-release 81 mg tablet Take 81 mg by mouth daily.  levothyroxine (synthroid) 25 mcg tablet Take 50 mcg by mouth Daily (before breakfast).          Past History     Past Medical History:  Past Medical History:   Diagnosis Date    Anxiety     Breast cancer [facility]) 1980    right mastectomy    GI bleed     High cholesterol     Hypertension     Hypothyroid     Pacemaker     Sick sinus syndrome ([facility] [address]     Stroke [facility])        Past Surgical History:  Past Surgical History:   Procedure Laterality Date    HX COLONOSCOPY      HX ENDOSCOPY      HX HEMORRHOIDECTOMY      HX HYSTERECTOMY      HX PACEMAKER      HX PACEMAKER PLACEMENT      MD BREAST SURGERY PROCEDURE UNLISTED      right mastectomy    MD INS PM PLS GEN W/EXIST SINGLE LEAD N/A 10/1/2020    INSERT PPM SINGLE LEAD GEN ONLY performed by Ronald Greenberg MD at [address] [place] [address]       Family History:  History reviewed. No pertinent family history. Social History:  Social History     Tobacco Use    Smoking status: Never Smoker    Smokeless tobacco: Never Used   Substance Use Topics    Alcohol use: Never    Drug use: Never       Allergies:  No Known Allergies      Review of Systems   Review of Systems   Constitutional: Negative. Negative for chills, fatigue and fever. HENT: Negative. Negative for congestion, nosebleeds and sore throat. Eyes: Negative. Negative for pain, discharge and visual disturbance. Respiratory: Positive for shortness of breath. Negative for cough and chest tightness. Cardiovascular: Positive for palpitations. Negative for chest pain and leg swelling. Gastrointestinal: Negative for abdominal pain, blood in stool, constipation, diarrhea, nausea and vomiting. Endocrine: Negative. Genitourinary: Negative. Negative for difficulty urinating, dysuria, pelvic pain and vaginal bleeding. Musculoskeletal: Negative. Negative for arthralgias, back pain and myalgias. Skin: Negative. Negative for rash and wound. Allergic/Immunologic: Negative. Neurological: Negative. Negative for dizziness, syncope, weakness, numbness and headaches. Hematological: Negative. Psychiatric/Behavioral: Negative.   Negative for agitation, confusion and suicidal ideas. All other systems reviewed and are negative. Physical Exam   Physical Exam  Vitals and nursing note reviewed. Exam conducted with a chaperone present. Constitutional:       Appearance: Normal appearance. She is normal weight. She is ill-appearing. HENT:      Head: Normocephalic and atraumatic. Nose: Nose normal.      Mouth/Throat:      Mouth: Mucous membranes are moist.      Pharynx: Oropharynx is clear. Eyes:      Extraocular Movements: Extraocular movements intact. Conjunctiva/sclera: Conjunctivae normal.      Pupils: Pupils are equal, round, and reactive to light. Cardiovascular:      Rate and Rhythm: Tachycardia present. Rhythm irregular. Pulses: Normal pulses. Heart sounds: Normal heart sounds. Pulmonary:      Effort: Pulmonary effort is normal. Tachypnea present. No respiratory distress. Breath sounds: Normal breath sounds. Abdominal:      General: Abdomen is flat. Bowel sounds are normal. There is no distension. Palpations: Abdomen is soft. Tenderness: There is no abdominal tenderness. There is no guarding. Musculoskeletal:         General: No swelling, tenderness, deformity or signs of injury. Normal range of motion. Cervical back: Normal range of motion and neck supple. Right lower leg: No edema. Left lower leg: No edema. Skin:     General: Skin is warm and dry. Capillary Refill: Capillary refill takes less than 2 seconds. Findings: No lesion or rash. Neurological:      General: No focal deficit present. Mental Status: She is alert and oriented to person, place, and time. Mental status is at baseline. Cranial Nerves: No cranial nerve deficit. Psychiatric:         Mood and Affect: Mood normal.         Behavior: Behavior normal.         Thought Content:  Thought content normal.         Judgment: Judgment normal.         Diagnostic Study Results     Labs -     Recent Results (from the past 12 hour(s))   CBC WITH AUTOMATED DIFF    Collection Time: 04/22/22 10:53 AM   Result Value Ref Range    WBC 12.4 (H) 3.6 - 11.0 K/uL    RBC 4.39 3.80 - 5.20 M/uL    HGB 13.5 11.5 - 16.0 g/dL    HCT 41.1 35.0 - 47.0 %    MCV 93.6 80.0 - 99.0 FL    MCH 30.8 26.0 - 34.0 PG    MCHC 32.8 30.0 - 36.5 g/dL    RDW 13.5 11.5 - 14.5 %    PLATELET 498 331 - 308 K/uL    MPV 11.6 8.9 - 12.9 FL    NRBC 0.0 0.0  WBC    ABSOLUTE NRBC 0.00 0.00 - 0.01 K/uL    NEUTROPHILS 83 (H) 32 - 75 %    LYMPHOCYTES 3 (L) 12 - 49 %    MONOCYTES 13 5 - 13 %    EOSINOPHILS 0 0 - 7 %    BASOPHILS 0 0 - 1 %    IMMATURE GRANULOCYTES 1 (H) 0 - 0.5 %    ABS. NEUTROPHILS 10.3 (H) 1.8 - 8.0 K/UL    ABS. LYMPHOCYTES 0.4 (L) 0.8 - 3.5 K/UL    ABS. MONOCYTES 1.6 (H) 0.0 - 1.0 K/UL    ABS. EOSINOPHILS 0.0 0.0 - 0.4 K/UL    ABS. BASOPHILS 0.0 0.0 - 0.1 K/UL    ABS. IMM. GRANS. 0.1 (H) 0.00 - 0.04 K/UL    DF AUTOMATED     METABOLIC PANEL, COMPREHENSIVE    Collection Time: 04/22/22 10:53 AM   Result Value Ref Range    Sodium 136 136 - 145 mmol/L    Potassium 4.6 3.5 - 5.1 mmol/L    Chloride 103 97 - 108 mmol/L    CO2 30 21 - 32 mmol/L    Anion gap 3 (L) 5 - 15 mmol/L    Glucose 138 (H) 65 - 100 mg/dL    BUN 22 (H) 6 - 20 mg/dL    Creatinine 0.83 0.55 - 1.02 mg/dL    BUN/Creatinine ratio 27 (H) 12 - 20      GFR est AA >60 >60 ml/min/1.73m2    GFR est non-AA >60 >60 ml/min/1.73m2    Calcium 9.3 8.5 - 10.1 mg/dL    Bilirubin, total 1.1 (H) 0.2 - 1.0 mg/dL    AST (SGOT) 54 (H) 15 - 37 U/L    ALT (SGPT) 51 12 - 78 U/L    Alk.  phosphatase 76 45 - 117 U/L    Protein, total 7.3 6.4 - 8.2 g/dL    Albumin 3.9 3.5 - 5.0 g/dL    Globulin 3.4 2.0 - 4.0 g/dL    A-G Ratio 1.1 1.1 - 2.2     NT-PRO BNP    Collection Time: 04/22/22 10:53 AM   Result Value Ref Range    NT pro-BNP 16,749 (H) <450 pg/mL   TROPONIN-HIGH SENSITIVITY    Collection Time: 04/22/22 10:54 AM   Result Value Ref Range    Troponin-High Sensitivity 157 (HH) 0 - 51 ng/L   LACTIC ACID    Collection Time: 04/22/22 11:12 AM   Result Value Ref Range    Lactic acid 2.0 0.4 - 2.0 mmol/L         Radiologic Studies -   XR CHEST SNGL V   Final Result   Hydrostatic edema suspected. CT Results  (Last 48 hours)    None        CXR Results  (Last 48 hours)               04/22/22 1114  XR CHEST SNGL V Final result    Impression:  Hydrostatic edema suspected. Narrative:  Chest, frontal view, 4/22/2022       History: Chest pain. Shortness of breath. Comparison: None. Findings: Left-sided pacemaker device with 2 leads in place. The cardiac   silhouette is enlarged. The thoracic aorta has atherosclerotic plaque. The   lungs are adequately expanded. Apical pleural thickening is noted. There is   prominence of the pulmonary vasculature and hydrostatic edema is suspected. No   focal consolidation, pleural effusion or pneumothorax is identified. Degenerative changes are seen in the shoulders and spine. There are surgical   clips at the right axilla. Medical Decision Making and ED Course   I am the first provider for this patient. I reviewed the vital signs, available nursing notes, past medical history, past surgical history, family history and social history. Vital Signs-Reviewed the patient's vital signs. Patient Vitals for the past 12 hrs:   Temp Pulse Resp BP SpO2   04/22/22 1205 -- -- -- -- 93 %   04/22/22 1204 -- -- -- -- (!) 89 %   04/22/22 1146 -- 98 21 (!) 134/90 92 %   04/22/22 1132 -- (!) 136 -- (!) 135/106 --   04/22/22 1036 97.5 °F (36.4 °C) (!) 148 28 (!) 144/87 94 %       EKG interpretation:   EKG at 1033 atrial fibrillation with rapid ventricular response. Rate of 149. Irregular irregular intervals. Left anterior fascicular block. Reason rule out dysrhythmia. Interpreted by ER physician. Records Reviewed: Previous Hospital chart. EMS run report      ED Course:   Initial assessment performed.  The patients presenting problems have been discussed, and they are in agreement with the care plan formulated and outlined with them. I have encouraged them to ask questions as they arise throughout their visit. Orders Placed This Encounter    CULTURE, BLOOD, PAIRED     Standing Status:   Standing     Number of Occurrences:   1    CULTURE, BLOOD     Standing Status:   Standing     Number of Occurrences:   1    XR CHEST SNGL V     Standing Status:   Standing     Number of Occurrences:   1     Order Specific Question:   Transport     Answer:   Stretcher [5]     Order Specific Question:   Reason for Exam     Answer:   chest pain , sob    CBC WITH AUTOMATED DIFF     Standing Status:   Standing     Number of Occurrences:   1    METABOLIC PANEL, COMPREHENSIVE     Standing Status:   Standing     Number of Occurrences:   1    TROPONIN-HIGH SENSITIVITY     Standing Status:   Standing     Number of Occurrences:   1    BNP (NT-PRO)     Standing Status:   Standing     Number of Occurrences:   1    LACTIC ACID, PLASMA     If lactic acid level is greater than 2, then a repeat lactic acid level is to be drawn in 6 hours. Standing Status:   Standing     Number of Occurrences:   1    LACTIC ACID, PLASMA     If initial lactic acid level is greater than 2, then a repeat lactic acid level is to be drawn in 6 hours.      Standing Status:   Standing     Number of Occurrences:   780[address]    URINALYSIS W/ RFLX MICROSCOPIC     Standing Status:   Standing     Number of Occurrences:   1    TROPONIN-HIGH SENSITIVITY     Standing Status:   Standing     Number of Occurrences:   1    PROTHROMBIN TIME + INR     Standing Status:   Standing     Number of Occurrences:   1    EKG 12 LEAD INITIAL     Standing Status:   Standing     Number of Occurrences:   1     Order Specific Question:   Reason for Exam:     Answer:   chest pain    EKG, 12 LEAD, REPEAT     Standing Status:   Standing     Number of Occurrences:   1     Order Specific Question:   Reason for Exam:     Answer:   s/p dilt    cefTRIAXone (ROCEPHIN) 1 g in sterile water (preservative free) 10 mL IV syringe     Order Specific Question:   Antibiotic Indications     Answer:   Upper Respiratory Infection    dilTIAZem (CARDIZEM) injection 10 mg    dilTIAZem (CARDIZEM) 125 mg/125 mL (1 mg/mL) dextrose 5% infusion     Order Specific Question:   Titrate Infusion? Answer:   Yes     Order Specific Question:   Initial Infusion Rate: Answer:   2.5 mg/hr     Order Specific Question:   Titrate Every 30 Minutes in Increments of     Answer:   5 mg/hr     Order Specific Question:   Goal of Therapy is: Answer:   HR less than 100 bpm     Order Specific Question:   Contact Physician for     Answer:   SBP less than 90 mmHg     Order Specific Question:   HOLD for     Answer:   HR less than 60 bpm    furosemide (LASIX) injection 40 mg                 Provider Notes (Medical Decision Making):   8-year-old female presents with chest pain shortness of breath tachycardia EKG cardiac monitoring showing atrial fibrillation with rapid ventricular response. Good blood pressure. Patient given diltiazem and placed on drip. Heart rate controlled. Liver serial troponins admission with cardiology consult. Consults  kometa admit      ED Course as of 04/23/22 1617   Fri Apr 22, 2022   1249 EKG at 1241 atrial flutter with variable AV block rate of 97. LVH. No ST changes. No T wave inversions. Reason rule out ACS. Interpreted by ER physician.  [HP]      ED Course User Index  [HP] Marina Curtis MD         Admitted    Procedures           CRITICAL CARE NOTE : afib mgnt, dilt drip, ekg reassment4:19 PM  Amount of Critical Care Time: 35 minutes    IMPENDING DETERIORATION -Airway, Respiratory and Cardiovascular  ASSOCIATED RISK FACTORS - Dysrhythmia  MANAGEMENT- Bedside Assessment and Supervision of Care  INTERPRETATION -  ECG and Blood Pressure  INTERVENTIONS - hemodynamic mngmt  CASE REVIEW - Hospitalist/Intensivist and Nursing  TREATMENT RESPONSE -Stable  PERFORMED BY - Self    NOTES   :  I have spent critical care time involved in lab review, consultations with specialist, family decision- making, bedside attention and documentation. This time excludes time spent in any separate billed procedures. During this entire length of time I was immediately available to the patient . Mariann Mcguire MD                Disposition       Emergency Department Disposition:  Admitted      Diagnosis     Clinical Impression:   1. Atrial fibrillation with rapid ventricular response ([address]    2. Congestive heart failure, unspecified HF chronicity, unspecified heart failure type ([address]    3. Elevated troponin        Attestations:    Mariann Mcguire MD    Please note that this dictation was completed with Ever[employer], the computer voice recognition software. Quite often unanticipated grammatical, syntax, homophones, and other interpretive errors are inadvertently transcribed by the computer software. Please disregard these errors. Please excuse any errors that have escaped final proofreading. Thank you.

## 2022-04-22 NOTE — CONSULTS
Cardiology Consult    NAME: Mary Ann Wilson   :  9/3/1919   MRN:  [id number]     Date/Time:  2022 3:17 PM    Patient PCP: Desirae Landeros MD  ________________________________________________________________________     Assessment:   Atrial fibrillation with RVR  Acute type II non-Q wave MI  Aortic valve disease  Hypertension  Sick sinus syndrome status post pacemaker      Plan:   IV Cardizem for rate control of A. Fib  Resume metoprolol p.o. Heparin drip for stroke prevention  Warfarin versus NOAC due to A. Fib prior to discharge  Thyroid function check  Can follow-up with her regular cardiologist, Dr. Edmundo Mathews      []        High complexity decision making was performed        Subjective:   CHIEF COMPLAINT: Shortness of breath      REASON FOR CONSULT: Atrial fibrillation      HISTORY OF PRESENT ILLNESS:     Mary Ann Wilson is a 80 y.o. WHITE/NON- female who has a history of hypertension, PVCs, sick sinus syndrome, status post PPM.    She presents to the ER with 2 days of exertional shortness of breath, palpitations, and chest tightness with exertion. Patient says she was no longer able to walk to her mailbox because she felt completely out of breath. She has been having increasing shortness of breath over the last 2 days. However, the family says its been going on for several months. She last had a pacemaker check in February and sees Dr. Edmundo Mathews in the office. No syncope, history of hemorrhoidal bleeds, no history of CVA.       Past Medical History:   Diagnosis Date    Anxiety     Breast cancer ([facility] [address] 1980    right mastectomy    GI bleed     High cholesterol     Hypertension     Hypothyroid     Pacemaker     Sick sinus syndrome ([facility] [address]     Stroke [facility])       Past Surgical History:   Procedure Laterality Date    HX COLONOSCOPY      HX ENDOSCOPY      HX HEMORRHOIDECTOMY      HX HYSTERECTOMY      HX PACEMAKER      HX PACEMAKER PLACEMENT      AL BREAST SURGERY PROCEDURE UNLISTED      right mastectomy    NH INS PM PLS GEN W/EXIST SINGLE LEAD N/A 10/1/2020    INSERT PPM SINGLE LEAD GEN ONLY performed by Claudia Baez MD at [address] [place] [address]     No Known Allergies   Meds:  See below  Social History     Tobacco Use    Smoking status: Never Smoker    Smokeless tobacco: Never Used   Substance Use Topics    Alcohol use: Never      History reviewed. No pertinent family history. REVIEW OF SYSTEMS:     []         Unable to obtain  ROS due to ---   [x]         Total of 12 systems reviewed as follows:    Constitutional: negative fever, negative chills, negative weight loss  Eyes:   negative visual changes  ENT:   negative sore throat, tongue or lip swelling  Respiratory:  + cough, negative dyspnea  Cards:  + for chest pain, palpitations  GI:   negative for nausea, vomiting, diarrhea, and abdominal pain  Genitourinary: negative for frequency, dysuria  Integument:  negative for rash   Hematologic:  negative for easy bruising and gum/nose bleeding  Musculoskel: negative for myalgias,  back pain  Neurological:  + weakness  Behavl/Psych: negative for feelings of anxiety, depression     Pertinent Positives include :    Objective:      Physical Exam:    Last 24hrs VS reviewed since prior progress note. Most recent are:    Visit Vitals  BP (!) 134/90   Pulse 98   Temp 97.5 °F (36.4 °C)   Resp 21   Ht 5' 3\" (1.6 m)   Wt 45.4 kg (100 lb)   SpO2 93%   BMI 17.71 kg/m²     No intake or output data in the 24 hours ending 04/22/22 1517     General Appearance: Thin, alert, no acute distress. Ears/Nose/Mouth/Throat: Moist mucosa  Neck: Supple. JVP within normal limits. Carotids good upstrokes  Chest: Lungs clear to auscultation bilaterally. Cardiovascular: Irregularly irregular rhythm, S1S2 normal, loud systolic ejection murmur  Abdomen: Soft, non-tender, bowel sounds are active. Extremities: No edema bilaterally. distal pulses +1. Skin: Warm and dry.   Neuro: Alert and oriented x3, normal speech; follows simple commands  Psychiatric: Cooperative; appropriate    []         Post-cath site without hematoma, bruit, tenderness, or thrill. Distal pulses intact. Data:      Telemetry: Atrial fibrillation 90s and low 100s    EKG: A. fib with heart rate 160-180  []  No new EKG for review. Prior to Admission medications    Medication Sig Start Date End Date Taking? Authorizing Provider   hydrocortisone (CORTAID) 0.5 % topical cream Apply  to affected area two (2) times a day. use thin layer    Provider, Historical   loratadine 10 mg cap Take  by mouth. Provider, Historical   pantoprazole (PROTONIX) 20 mg tablet Take 20 mg by mouth daily. Provider, Historical   cyanocobalamin 1,000 mcg tablet Take 1,000 mcg by mouth daily. Provider, Historical   metoprolol succinate (TOPROL-XL) 25 mg XL tablet Take 25 mg by mouth daily. Provider, Historical   aspirin delayed-release 81 mg tablet Take 81 mg by mouth daily. Provider, Historical   levothyroxine (synthroid) 25 mcg tablet Take 50 mcg by mouth Daily (before breakfast).     Provider, Historical       Recent Results (from the past 24 hour(s))   EKG, 12 LEAD, INITIAL    Collection Time: 04/22/22 10:33 AM   Result Value Ref Range    Ventricular Rate 149 BPM    Atrial Rate 159 BPM    QRS Duration 90 ms    Q-T Interval 314 ms    QTC Calculation (Bezet) 494 ms    Calculated R Axis -58 degrees    Calculated T Axis 88 degrees    Diagnosis       Atrial fibrillation with rapid ventricular response  RSR' or QR pattern in V1 suggests right ventricular conduction delay  Left anterior fascicular block  Septal infarct , age undetermined  ST & T wave abnormality, consider lateral ischemia  Abnormal ECG  No previous ECGs available  Confirmed by Gisella Lundberg M.D., Prabhu Teague ([id number]) on 4/22/2022 12:53:02 PM     CBC WITH AUTOMATED DIFF    Collection Time: 04/22/22 10:53 AM   Result Value Ref Range    WBC 12.4 (H) 3.6 - 11.0 K/uL    RBC 4.39 3.80 - 5.20 M/uL    HGB 13.5 11.5 - 16.0 g/dL    HCT 41.1 35.0 - 47.0 %    MCV 93.6 80.0 - 99.0 FL    MCH 30.8 26.0 - 34.0 PG    MCHC 32.8 30.0 - 36.5 g/dL    RDW 13.5 11.5 - 14.5 %    PLATELET 997 714 - 156 K/uL    MPV 11.6 8.9 - 12.9 FL    NRBC 0.0 0.0  WBC    ABSOLUTE NRBC 0.00 0.00 - 0.01 K/uL    NEUTROPHILS 83 (H) 32 - 75 %    LYMPHOCYTES 3 (L) 12 - 49 %    MONOCYTES 13 5 - 13 %    EOSINOPHILS 0 0 - 7 %    BASOPHILS 0 0 - 1 %    IMMATURE GRANULOCYTES 1 (H) 0 - 0.5 %    ABS. NEUTROPHILS 10.3 (H) 1.8 - 8.0 K/UL    ABS. LYMPHOCYTES 0.4 (L) 0.8 - 3.5 K/UL    ABS. MONOCYTES 1.6 (H) 0.0 - 1.0 K/UL    ABS. EOSINOPHILS 0.0 0.0 - 0.4 K/UL    ABS. BASOPHILS 0.0 0.0 - 0.1 K/UL    ABS. IMM. GRANS. 0.1 (H) 0.00 - 0.04 K/UL    DF AUTOMATED     METABOLIC PANEL, COMPREHENSIVE    Collection Time: 04/22/22 10:53 AM   Result Value Ref Range    Sodium 136 136 - 145 mmol/L    Potassium 4.6 3.5 - 5.1 mmol/L    Chloride 103 97 - 108 mmol/L    CO2 30 21 - 32 mmol/L    Anion gap 3 (L) 5 - 15 mmol/L    Glucose 138 (H) 65 - 100 mg/dL    BUN 22 (H) 6 - 20 mg/dL    Creatinine 0.83 0.55 - 1.02 mg/dL    BUN/Creatinine ratio 27 (H) 12 - 20      GFR est AA >60 >60 ml/min/1.73m2    GFR est non-AA >60 >60 ml/min/1.73m2    Calcium 9.3 8.5 - 10.1 mg/dL    Bilirubin, total 1.1 (H) 0.2 - 1.0 mg/dL    AST (SGOT) 54 (H) 15 - 37 U/L    ALT (SGPT) 51 12 - 78 U/L    Alk.  phosphatase 76 45 - 117 U/L    Protein, total 7.3 6.4 - 8.2 g/dL    Albumin 3.9 3.5 - 5.0 g/dL    Globulin 3.4 2.0 - 4.0 g/dL    A-G Ratio 1.1 1.1 - 2.2     NT-PRO BNP    Collection Time: 04/22/22 10:53 AM   Result Value Ref Range    NT pro-BNP 16,749 (H) <450 pg/mL   TROPONIN-HIGH SENSITIVITY    Collection Time: 04/22/22 10:54 AM   Result Value Ref Range    Troponin-High Sensitivity 157 (HH) 0 - 51 ng/L   LACTIC ACID    Collection Time: 04/22/22 11:12 AM   Result Value Ref Range    Lactic acid 2.0 0.4 - 2.0 mmol/L   TROPONIN-HIGH SENSITIVITY    Collection Time: 04/22/22 12:38 PM   Result Value Ref Range Troponin-High Sensitivity 159 (HH) 0 - 51 ng/L   EKG, 12 LEAD, SUBSEQUENT    Collection Time: 04/22/22 12:41 PM   Result Value Ref Range    Ventricular Rate 97 BPM    Atrial Rate 278 BPM    QRS Duration 86 ms    Q-T Interval 382 ms    QTC Calculation (Bezet) 485 ms    Calculated R Axis -70 degrees    Calculated T Axis -74 degrees    Diagnosis       Atrial flutter with variable A-V block  Left axis deviation  Minimal voltage criteria for LVH, may be normal variant  Anterior infarct (cited on or before 22-APR-2022)  ST & T wave abnormality, consider lateral ischemia  When compared with ECG of 22-APR-2022 10:33, (Unconfirmed)  Atrial flutter has replaced Atrial fibrillation  Vent.  rate has decreased BY  52 BPM  Serial changes of Anterior infarct Present  Confirmed by Crissy Strauss M.D., Stefania Garcia ([id number]) on 4/22/2022 12:53:40 PM     PROTHROMBIN TIME + INR    Collection Time: 04/22/22  1:13 PM   Result Value Ref Range    Prothrombin time 14.5 11.9 - 14.6 sec    INR 1.1 0.9 - 1.1          Marly Martines MD

## 2022-04-23 LAB
ANION GAP SERPL CALC-SCNC: 4 MMOL/L (ref 5–15)
APPEARANCE UR: ABNORMAL
APTT PPP: 42.9 SEC (ref 21.2–34.1)
ATRIAL RATE: 60 BPM
BACTERIA URNS QL MICRO: NEGATIVE /HPF
BACTERIA URNS QL MICRO: NEGATIVE /HPF
BILIRUB UR QL: NEGATIVE
BUN SERPL-MCNC: 25 MG/DL (ref 6–20)
BUN/CREAT SERPL: 40 (ref 12–20)
CA-I BLD-MCNC: 8.4 MG/DL (ref 8.5–10.1)
CALCULATED R AXIS, ECG10: -53 DEGREES
CALCULATED T AXIS, ECG11: 123 DEGREES
CHLORIDE SERPL-SCNC: 103 MMOL/L (ref 97–108)
CO2 SERPL-SCNC: 31 MMOL/L (ref 21–32)
COLOR UR: ABNORMAL
CREAT SERPL-MCNC: 0.63 MG/DL (ref 0.55–1.02)
DIAGNOSIS, 93000: NORMAL
ERYTHROCYTE [DISTWIDTH] IN BLOOD BY AUTOMATED COUNT: 13.4 % (ref 11.5–14.5)
GLUCOSE SERPL-MCNC: 108 MG/DL (ref 65–100)
GLUCOSE UR STRIP.AUTO-MCNC: NEGATIVE MG/DL
HCT VFR BLD AUTO: 35.9 % (ref 35–47)
HGB BLD-MCNC: 11.6 G/DL (ref 11.5–16)
HGB UR QL STRIP: NEGATIVE
KETONES UR QL STRIP.AUTO: NEGATIVE MG/DL
LEUKOCYTE ESTERASE UR QL STRIP.AUTO: ABNORMAL
MCH RBC QN AUTO: 30.6 PG (ref 26–34)
MCHC RBC AUTO-ENTMCNC: 32.3 G/DL (ref 30–36.5)
MCV RBC AUTO: 94.7 FL (ref 80–99)
NITRITE UR QL STRIP.AUTO: NEGATIVE
NRBC # BLD: 0 K/UL (ref 0–0.01)
NRBC BLD-RTO: 0 PER 100 WBC
P-R INTERVAL, ECG05: 214 MS
PH UR STRIP: 5 [PH] (ref 5–8)
PLATELET # BLD AUTO: 141 K/UL (ref 150–400)
PMV BLD AUTO: 11.2 FL (ref 8.9–12.9)
POTASSIUM SERPL-SCNC: 3.6 MMOL/L (ref 3.5–5.1)
PROT UR STRIP-MCNC: 30 MG/DL
Q-T INTERVAL, ECG07: 414 MS
QRS DURATION, ECG06: 94 MS
QTC CALCULATION (BEZET), ECG08: 427 MS
RBC # BLD AUTO: 3.79 M/UL (ref 3.8–5.2)
RBC #/AREA URNS HPF: ABNORMAL /HPF (ref 0–5)
RBC #/AREA URNS HPF: NORMAL /HPF (ref 0–5)
SODIUM SERPL-SCNC: 138 MMOL/L (ref 136–145)
SP GR UR REFRACTOMETRY: 1.02 (ref 1–1.03)
THERAPEUTIC RANGE,PTTT: ABNORMAL SEC (ref 82–109)
TSH SERPL DL<=0.05 MIU/L-ACNC: 2.07 UIU/ML (ref 0.36–3.74)
UROBILINOGEN UR QL STRIP.AUTO: 0.1 EU/DL (ref 0.1–1)
VENTRICULAR RATE, ECG03: 64 BPM
WBC # BLD AUTO: 7.9 K/UL (ref 3.6–11)
WBC URNS QL MICRO: ABNORMAL /HPF (ref 0–4)
WBC URNS QL MICRO: NORMAL /HPF (ref 0–4)

## 2022-04-23 PROCEDURE — 36415 COLL VENOUS BLD VENIPUNCTURE: CPT

## 2022-04-23 PROCEDURE — 93005 ELECTROCARDIOGRAM TRACING: CPT

## 2022-04-23 PROCEDURE — 77010033678 HC OXYGEN DAILY

## 2022-04-23 PROCEDURE — 81001 URINALYSIS AUTO W/SCOPE: CPT

## 2022-04-23 PROCEDURE — 74011000250 HC RX REV CODE- 250: Performed by: INTERNAL MEDICINE

## 2022-04-23 PROCEDURE — 74011250637 HC RX REV CODE- 250/637: Performed by: INTERNAL MEDICINE

## 2022-04-23 PROCEDURE — 97165 OT EVAL LOW COMPLEX 30 MIN: CPT

## 2022-04-23 PROCEDURE — 97530 THERAPEUTIC ACTIVITIES: CPT

## 2022-04-23 PROCEDURE — 85730 THROMBOPLASTIN TIME PARTIAL: CPT

## 2022-04-23 PROCEDURE — 84443 ASSAY THYROID STIM HORMONE: CPT

## 2022-04-23 PROCEDURE — 85027 COMPLETE CBC AUTOMATED: CPT

## 2022-04-23 PROCEDURE — 74011250636 HC RX REV CODE- 250/636: Performed by: INTERNAL MEDICINE

## 2022-04-23 PROCEDURE — 80048 BASIC METABOLIC PNL TOTAL CA: CPT

## 2022-04-23 PROCEDURE — 65270000029 HC RM PRIVATE

## 2022-04-23 RX ORDER — FUROSEMIDE 40 MG/1
20 TABLET ORAL DAILY
Status: DISCONTINUED | OUTPATIENT
Start: 2022-04-24 | End: 2022-04-25 | Stop reason: HOSPADM

## 2022-04-23 RX ORDER — DILTIAZEM HYDROCHLORIDE 120 MG/1
120 CAPSULE, COATED, EXTENDED RELEASE ORAL DAILY
Status: DISCONTINUED | OUTPATIENT
Start: 2022-04-23 | End: 2022-04-25 | Stop reason: HOSPADM

## 2022-04-23 RX ORDER — DILTIAZEM HCL/D5W 125 MG/125
5 PLASTIC BAG, INJECTION (ML) INTRAVENOUS CONTINUOUS
Status: DISCONTINUED | OUTPATIENT
Start: 2022-04-23 | End: 2022-04-23

## 2022-04-23 RX ADMIN — Medication 10 MG/HR: at 01:26

## 2022-04-23 RX ADMIN — FUROSEMIDE 40 MG: 10 INJECTION, SOLUTION INTRAMUSCULAR; INTRAVENOUS at 08:46

## 2022-04-23 RX ADMIN — HYDROCORTISONE 1 BOTTLE: 1 LOTION TOPICAL at 08:46

## 2022-04-23 RX ADMIN — ASPIRIN 81 MG: 81 TABLET, COATED ORAL at 08:46

## 2022-04-23 RX ADMIN — APIXABAN 2.5 MG: 2.5 TABLET, FILM COATED ORAL at 11:26

## 2022-04-23 RX ADMIN — HEPARIN SODIUM AND DEXTROSE 12 UNITS/KG/HR: 10000; 5 INJECTION INTRAVENOUS at 00:03

## 2022-04-23 RX ADMIN — LORATADINE 10 MG: 10 TABLET ORAL at 08:46

## 2022-04-23 RX ADMIN — DILTIAZEM HYDROCHLORIDE 120 MG: 120 CAPSULE, COATED, EXTENDED RELEASE ORAL at 11:26

## 2022-04-23 RX ADMIN — SODIUM CHLORIDE, PRESERVATIVE FREE 10 ML: 5 INJECTION INTRAVENOUS at 21:06

## 2022-04-23 RX ADMIN — Medication 5 MG/HR: at 08:45

## 2022-04-23 RX ADMIN — SODIUM CHLORIDE, PRESERVATIVE FREE 10 ML: 5 INJECTION INTRAVENOUS at 06:44

## 2022-04-23 RX ADMIN — CYANOCOBALAMIN TAB 1000 MCG 1000 MCG: 1000 TAB at 11:26

## 2022-04-23 RX ADMIN — METOPROLOL SUCCINATE 25 MG: 25 TABLET, EXTENDED RELEASE ORAL at 08:46

## 2022-04-23 RX ADMIN — SODIUM CHLORIDE, PRESERVATIVE FREE 10 ML: 5 INJECTION INTRAVENOUS at 16:11

## 2022-04-23 RX ADMIN — LEVOTHYROXINE SODIUM 50 MCG: 0.03 TABLET ORAL at 08:46

## 2022-04-23 RX ADMIN — HEPARIN SODIUM 1360 UNITS: 1000 INJECTION, SOLUTION INTRAVENOUS; SUBCUTANEOUS at 09:19

## 2022-04-23 RX ADMIN — HYDROCORTISONE 1 BOTTLE: 1 LOTION TOPICAL at 21:06

## 2022-04-23 RX ADMIN — APIXABAN 2.5 MG: 2.5 TABLET, FILM COATED ORAL at 21:05

## 2022-04-23 RX ADMIN — PANTOPRAZOLE SODIUM 20 MG: 20 TABLET, DELAYED RELEASE ORAL at 08:46

## 2022-04-23 RX ADMIN — HEPARIN SODIUM AND DEXTROSE 14 UNITS/KG/HR: 10000; 5 INJECTION INTRAVENOUS at 09:19

## 2022-04-23 NOTE — PROGRESS NOTES
Problem: Falls - Risk of  Goal: *Absence of Falls  Description: Document Yue Quick Fall Risk and appropriate interventions in the flowsheet.   Outcome: Progressing Towards Goal  Note: Fall Risk Interventions:  Mobility Interventions: Bed/chair exit alarm,Patient to call before getting OOB         Medication Interventions: Bed/chair exit alarm,Patient to call before getting OOB    Elimination Interventions: Bed/chair exit alarm,Call light in reach,Patient to call for help with toileting needs              Problem: Patient Education: Go to Patient Education Activity  Goal: Patient/Family Education  Outcome: Progressing Towards Goal     Problem: Patient Education: Go to Patient Education Activity  Goal: Patient/Family Education  Outcome: Progressing Towards Goal

## 2022-04-23 NOTE — PROGRESS NOTES
Problem: Falls - Risk of  Goal: *Absence of Falls  Description: Document [place] Fall Risk and appropriate interventions in the flowsheet.   Outcome: Progressing Towards Goal  Note: Fall Risk Interventions:  Mobility Interventions: Bed/chair exit alarm,Patient to call before getting OOB         Medication Interventions: Bed/chair exit alarm,Patient to call before getting OOB    Elimination Interventions: Call light in reach,Bed/chair exit alarm,Patient to call for help with toileting needs              Problem: Patient Education: Go to Patient Education Activity  Goal: Patient/Family Education  Outcome: Progressing Towards Goal

## 2022-04-23 NOTE — PROGRESS NOTES
CM informed that patient has questions for CM. CM met with patient, patients daughter Gio Harris), and other family members at bedside to discuss DC planning. CM informed all that therapy is recommending HH and a RW. Patient and daughter agreed, no preference for [facility] agency or DME supplier. CM sent referrals. Daughter inquiring if there is any other assistance the patient can get as she lives alone, but is checked in on often by family members. CM explained personal care services which is a benefit of Medicaid. 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 CM gave application for personal care services for [facility], but explained that application must be completed by patients PCP. Daughter verbalized understanding. CM noted that patient is currently wearing O2 and does not have O2 at home. CM will follow for potential home O2 needs. DCP: home with [facility] and a family member will provide transportation upon DC.

## 2022-04-23 NOTE — PROGRESS NOTES
Hospitalist Progress Note         Debbie Vázquez MD          Daily Progress Note: 4/23/2022      Subjective: The patient is seen for follow  up. 80 y.o. female who presents with complaints of progressive shortness of breath for the past few days. Notes intermittent chest palpitation without dizziness lightheadedness or falls. Evaluated in the ED and noted with atrial fibrillation with rapid ventricular response. Started on IV Cardizem with improvement in heart rate. BNP was 16,000. Chest x-ray with pulmonary edema    Patient seen in follow-up. Remains in atrial fibrillation with controlled rate.   No fevers overnight    Problem List:  Problem List as of 4/23/2022 Never Reviewed          Codes Class Noted - Resolved    A-fib [facility]) ICD-10-CM: I48.91  ICD-9-CM: 427.31  4/22/2022 - Present        Adjustment and management of cardiac pacemaker ICD-10-CM: Z45.018  ICD-9-CM: V53.31  10/1/2020 - Present              Medications reviewed  Current Facility-Administered Medications   Medication Dose Route Frequency    dilTIAZem ER (CARDIZEM CD) capsule 120 mg  120 mg Oral DAILY    apixaban (ELIQUIS) tablet 2.5 mg  2.5 mg Oral BID    [START ON 4/24/2022] furosemide (LASIX) tablet 20 mg  20 mg Oral DAILY    sodium chloride (NS) flush 5-40 mL  5-40 mL IntraVENous Q8H    sodium chloride (NS) flush 5-40 mL  5-40 mL IntraVENous PRN    acetaminophen (TYLENOL) tablet 650 mg  650 mg Oral Q6H PRN    Or    acetaminophen (TYLENOL) suppository 650 mg  650 mg Rectal Q6H PRN    polyethylene glycol (MIRALAX) packet 17 g  17 g Oral DAILY PRN    ondansetron (ZOFRAN ODT) tablet 4 mg  4 mg Oral Q8H PRN    Or    ondansetron (ZOFRAN) injection 4 mg  4 mg IntraVENous Q6H PRN    cyanocobalamin tablet 1,000 mcg  1,000 mcg Oral DAILY    hydrocortisone (ALA-BREA) 1 % lotion 1 Bottle  1 Bottle Topical BID    levothyroxine (SYNTHROID) tablet 50 mcg  50 mcg Oral ACB    loratadine (CLARITIN) tablet 10 mg  10 mg Oral DAILY    metoprolol succinate (TOPROL-XL) XL tablet 25 mg  25 mg Oral DAILY    pantoprazole (PROTONIX) tablet 20 mg  20 mg Oral DAILY       Review of Systems:   Review of systems not obtained due to patient factors. Objective:   Physical Exam:     Visit Vitals  /71 (BP 1 Location: Left upper arm, BP Patient Position: At rest)   Pulse (!) 59   Temp 97.9 °F (36.6 °C)   Resp 18   Ht 5' 3\" (1.6 m)   Wt 45.4 kg (100 lb)   SpO2 99%   BMI 17.71 kg/m²    O2 Flow Rate (L/min): 2 l/min O2 Device: Nasal cannula    Temp (24hrs), Av.8 °F (36.6 °C), Min:97.4 °F (36.3 °C), Max:98.2 °F (36.8 °C)    No intake/output data recorded. No intake/output data recorded. General:  Alert, cooperative, no distress, appears stated age. Lungs:   Clear to auscultation bilaterally. Chest wall:  No tenderness or deformity. Heart:  Regular rate and rhythm, S1, S2 normal, no murmur, click, rub or gallop. Abdomen:   Soft, non-tender. Bowel sounds normal. No masses,  No organomegaly. Extremities: Extremities normal, atraumatic, no cyanosis or edema. Pulses: 2+ and symmetric all extremities. Skin: Skin color, texture, turgor normal. No rashes or lesions   Neurologic: CNII-XII intact. No gross sensory or motor deficits     Data Review:       Recent Days:  Recent Labs     22  0619 22  1053   WBC 7.9 12.4*   HGB 11.6 13.5   HCT 35.9 41.1   * 169     Recent Labs     22  0619 22  1313 22  1053     --  136   K 3.6  --  4.6     --  103   CO2 31  --  30   *  --  138*   BUN 25*  --  22*   CREA 0.63  --  0.83   CA 8.4*  --  9.3   ALB  --   --  3.9   TBILI  --   --  1.1*   ALT  --   --  51   INR  --  1.1  --      No results for input(s): PH, PCO2, PO2, HCO3, FIO2 in the last 72 hours.     24 Hour Results:  Recent Results (from the past 24 hour(s))   TROPONIN-HIGH SENSITIVITY    Collection Time: 22 12:38 PM   Result Value Ref Range    Troponin-High Sensitivity 159 (HH) 0 - 51 ng/L   EKG, 12 LEAD, SUBSEQUENT    Collection Time: 04/22/22 12:41 PM   Result Value Ref Range    Ventricular Rate 97 BPM    Atrial Rate 278 BPM    QRS Duration 86 ms    Q-T Interval 382 ms    QTC Calculation (Bezet) 485 ms    Calculated R Axis -70 degrees    Calculated T Axis -74 degrees    Diagnosis       Atrial flutter with variable A-V block  Left axis deviation  Minimal voltage criteria for LVH, may be normal variant  Anterior infarct (cited on or before 22-APR-2022)  ST & T wave abnormality, consider lateral ischemia  When compared with ECG of 22-APR-2022 10:33, (Unconfirmed)  Atrial flutter has replaced Atrial fibrillation  Vent.  rate has decreased BY  52 BPM  Serial changes of Anterior infarct Present  Confirmed by Nara Castrejon M.D., [facility] ([id number]) on 4/22/2022 12:53:40 PM     PROTHROMBIN TIME + INR    Collection Time: 04/22/22  1:13 PM   Result Value Ref Range    Prothrombin time 14.5 11.9 - 14.6 sec    INR 1.1 0.9 - 1.1     PTT    Collection Time: 04/22/22  4:39 PM   Result Value Ref Range    aPTT 33.6 21.2 - 34.1 sec    aPTT, therapeutic range   82 - 063 sec   METABOLIC PANEL, BASIC    Collection Time: 04/23/22  6:19 AM   Result Value Ref Range    Sodium 138 136 - 145 mmol/L    Potassium 3.6 3.5 - 5.1 mmol/L    Chloride 103 97 - 108 mmol/L    CO2 31 21 - 32 mmol/L    Anion gap 4 (L) 5 - 15 mmol/L    Glucose 108 (H) 65 - 100 mg/dL    BUN 25 (H) 6 - 20 mg/dL    Creatinine 0.63 0.55 - 1.02 mg/dL    BUN/Creatinine ratio 40 (H) 12 - 20      GFR est AA >60 >60 ml/min/1.73m2    GFR est non-AA >60 >60 ml/min/1.73m2    Calcium 8.4 (L) 8.5 - 10.1 mg/dL   CBC W/O DIFF    Collection Time: 04/23/22  6:19 AM   Result Value Ref Range    WBC 7.9 3.6 - 11.0 K/uL    RBC 3.79 (L) 3.80 - 5.20 M/uL    HGB 11.6 11.5 - 16.0 g/dL    HCT 35.9 35.0 - 47.0 %    MCV 94.7 80.0 - 99.0 FL    MCH 30.6 26.0 - 34.0 PG    MCHC 32.3 30.0 - 36.5 g/dL    RDW 13.4 11.5 - 14.5 %    PLATELET 402 (L) 600 - 400 K/uL MPV 11.2 8.9 - 12.9 FL    NRBC 0.0 0.0  WBC    ABSOLUTE NRBC 0.00 0.00 - 0.01 K/uL   PTT    Collection Time: 04/23/22  6:19 AM   Result Value Ref Range    aPTT 42.9 (H) 21.2 - 34.1 sec    aPTT, therapeutic range   82 - 109 sec           Assessment/     Atrial fibrillation with rapid ventricular response  Probably new onset heart failure  History of GI bleed      Plan:  Continue supportive care including oral Cardizem  Discontinue IV Cardizem  Discontinue IV heparin and begin oral Eliquis  Monitor closely for bleeding  Clinical updates provided to daughter at [facility] discussed with: Patient/Family    Total time spent with patient: 30 minutes.     Marialuisa Escobedo MD

## 2022-04-23 NOTE — PROGRESS NOTES
OCCUPATIONAL THERAPY EVALUATION  Patient: Grupo Arzola (89 y.o. female)  Date: 4/23/2022  Primary Diagnosis: A-fib [facility]) [I48.91]        Precautions: fall risk       ASSESSMENT  Pt is a 81 y/o F with PMH of HTN, sick sinus syndrome, high cholesterol, hypothyroid, anxiety, s/p pacemaker, breast cancer with R mastectomy, stroke, and a GI bleed presenting to [facility] with c/o SOB and chest pain, admitted 04/22/22 and being treated for atrial fibrillation with RVR, possible new onset of heart failure, and hx of GI bleed. Pt is currently on 4L O2 via NC and pt reports she did not wear O2 PTA. Pt received semi-supine in bed upon arrival, AXO x4, and agreeable to OT evaluation at this time. Per pt report, pt lives alone in a one-story home with 1 OLIVIER through her garage with a L HR, was IND with ADLs and Mod I using SPC for mobility at [facility]. Pt reports she takes baths at home. Pt's daughter in room stated she visits pt daily and would like to have PCA care upon pt's discharge. Other DME owned includes: raised toilet seat, grab bars     Based on current observations, pt presents with deficits in generalized strength/AROM, balance, functional activity tolerance, and coordination impacting overall performance of ADLs and functional transfers/mobility. Pt demonstrates decreased AROM, strength, and coordination of BUE at this time that does not appear to impact overall functioning. Pt currently requires additional time for all activity. Pt long sat in bed and doffed dirty socks and donned clean socks with mod I. Pt completes bed mobility with SBA and additional time including rolling, sup>sit EOB, sit EOB>sup, and scooting. Pt completes sit EOB>stand with RW with SBA and additional time. Pt ambulated to bathroom with RW with SBA and additional time. Pt completed toilet transfer with SBA and additional time. Pt then completed toileting routine including bowel and bladder hygiene with mod I while seated on toilet.  Pt then ambulated to sink and washed hands with SBA. Pt ambulated back to bedside and returned supine with SBA and additional time. Pt appeared SOB upon returning to bed so pt's SpO2 checked and noted at 97%. Pt educated on PLB technique. Pt would benefit from re-education of the PLB technique. Overall, pt tolerates session fair. Pt would benefit from continued skilled OT services to address current impairments and improve IND and safety with self cares and functional transfers/mobility. Recommend discharge to [facility] once medically appropriate. Other factors to consider for discharge: home support, PLOF, severity of deficits     Patient will benefit from skilled therapy intervention to address the above noted impairments. PLAN :  Recommendations and Planned Interventions: functional mobility training, therapeutic exercise, balance training, therapeutic activities, endurance activities, patient education and home safety training    Frequency/Duration: Patient will be followed by occupational therapy:  2-3x/week to address goals. Recommendation for discharge: (in order for the patient to meet his/her long term goals)  Home with [address] [place] [address]    This discharge recommendation:  Has been made in collaboration with the attending provider and/or case management    IF patient discharges home will need the following DME: walker: rolling       SUBJECTIVE:   Patient stated I would love to walk to the bathroom.     OBJECTIVE DATA SUMMARY:   HISTORY:   Past Medical History:   Diagnosis Date    Anxiety     Breast cancer ([facility] [address] 1980    right mastectomy    GI bleed     High cholesterol     Hypertension     Hypothyroid     Pacemaker     Sick sinus syndrome ([facility] [address]     Stroke [facility])      Past Surgical History:   Procedure Laterality Date    HX COLONOSCOPY      HX ENDOSCOPY      HX HEMORRHOIDECTOMY      HX HYSTERECTOMY      HX PACEMAKER      HX PACEMAKER PLACEMENT      AZ BREAST SURGERY PROCEDURE UNLISTED right mastectomy    CA INS PM PLS GEN W/EXIST SINGLE LEAD N/A 10/1/2020    INSERT PPM SINGLE LEAD GEN ONLY performed by Clau Funez MD at [address] [place] [address]       Expanded or extensive additional review of patient history:     Home Situation  Home Environment: Private residence  # Steps to Enter: 1 (Through garage)  Rails to Enter: Yes  Hand Rails : Left  One/Two Story Residence: One story  Living Alone: Yes  Support Systems: Child(jac),Friend/Neighbor  Patient Expects to be Discharged to[de-identified] Home  Current DME Used/Available at Home: Raised toilet seat,Cane, straight  Tub or Shower Type: Tub (Pt takes baths)    Hand dominance: Right    EXAMINATION OF PERFORMANCE DEFICITS:  Cognitive/Behavioral Status:  Neurologic State: Alert  Orientation Level: Oriented X4    Hearing: Auditory  Auditory Impairment: None    Range of Motion:  AROM: Generally decreased, functional    Strength:  Strength: Generally decreased, functional    Coordination:  Coordination: Generally decreased, functional  Fine Motor Skills-Upper: Left Intact; Right Intact    Gross Motor Skills-Upper: Left Intact; Right Intact    Balance:  Sitting: Intact  Standing: Impaired; With support  Standing - Static: Fair;Constant support  Standing - Dynamic : Fair;Constant support    Functional Mobility and Transfers for ADLs:  Bed Mobility:  Rolling: Stand-by assistance; Additional time  Supine to Sit: Stand-by assistance; Additional time  Sit to Supine: Stand-by assistance; Additional time  Scooting: Stand-by assistance; Additional time    Transfers:  Sit to Stand: Stand-by assistance; Additional time  Stand to Sit: Stand-by assistance; Additional time  Bathroom Mobility: Stand-by assistance  Toilet Transfer : Stand-by assistance; Additional time    ADL Intervention and task modifications:  Grooming  Position Performed: Standing (At sink)  Washing Hands: Stand-by assistance     Lower Body Dressing Assistance  Socks: Modified independent  Position Performed: Long sitting on bed    Toileting  Bladder Hygiene: Modified independent  Bowel Hygiene: Modified indpendent  Clothing Management: Modified independent    Therapeutic Exercise:  Pt would benefit from UE HEP initiated at next session. Functional Measure:    [address] [place] [address] AM-PACTM \"6 Clicks\"                                                       Daily Activity Inpatient Short Form  How much help from another person does the patient currently need. .. Total; A Lot A Little None   1. Putting on and taking off regular lower body clothing? []  1 []  2 [x]  3 []  4   2. Bathing (including washing, rinsing, drying)? []  1 []  2 [x]  3 []  4   3. Toileting, which includes using toilet, bedpan or urinal? [] 1 []  2 [x]  3 []  4   4. Putting on and taking off regular upper body clothing? []  1 []  2 []  3 [x]  4   5. Taking care of personal grooming such as brushing teeth? []  1 []  2 [x]  3 []  4   6. Eating meals? []  1 []  2 []  3 [x]  4   © , Trustees of [address] [place] [address], under license to [employer]. All rights reserved     Score: 20/24     Interpretation of Tool:  Represents clinically-significant functional categories (i.e. Activities of daily living).   Percentage of Impairment CH    0%   CI    1-19% CJ    20-39% CK    40-59% CL    60-79% CM    80-99% CN     100%   AMPA  Score 6-24 24 23 20-22 15-19 10-14 7-9 6         Occupational Therapy Evaluation Charge Determination   History Examination Decision-Making   LOW Complexity : Brief history review  LOW Complexity : 1-3 performance deficits relating to physical, cognitive , or psychosocial skils that result in activity limitations and / or participation restrictions  LOW Complexity : No comorbidities that affect functional and no verbal or physical assistance needed to complete eval tasks       Based on the above components, the patient evaluation is determined to be of the following complexity level: LOW   Pain Ratin/10    Activity Tolerance: Fair  Please refer to the flowsheet for vital signs taken during this treatment. After treatment patient left in no apparent distress:    Supine in bed, Call bell within reach, Caregiver / family present, Side rails x 3 and nursing updated    COMMUNICATION/EDUCATION:   The patients plan of care was discussed with: Registered nurse. Patient/family agree to work toward stated goals and plan of care. This patients plan of care is appropriate for delegation to [facility]. Thank you for this referral.  Duncan Perales, OT  Time Calculation: 42 mins   Problem: Self Care Deficits Care Plan (Adult)  Goal: *Acute Goals and Plan of Care (Insert Text)  Description: 1. Pt will be mod I sup <> sit in prep for EOB ADLs  2. Pt will be mod I grooming sitting EOB/standing at sink  3. Pt will be mod I sit <>  prep for toileting LRAD  4. Pt will be mod I toilet transfer LRAD  5.  Pt will be IND following UE HEP in prep for self care tasks      Outcome: Not Met

## 2022-04-23 NOTE — PROGRESS NOTES
Progress Note      4/23/2022 10:43 AM  NAME: Vick Marquis   MRN:  [id number]   Admit Diagnosis: A-fib [facility]) [I48.91]          Assessment/Plan:     STEFFEN velasco with rapid ventricular response, appears to be back in sinus rhythm/sick sinus syndrome status post pacemaker. Will switch Cardizem to p.o. Will switch heparin to Eliquis. Decrease furosemide, will switch to p.o. NSTEMI without chest pain,? Type II, without peaking. With starting Eliquis will discontinue aspirin. Aortic valve disease               []       High complexity decision making was performed in this patient at high risk for decompensation with multiple organ involvement. Subjective:     Jolynn Serrano denies chest pain, dyspnea. Discussed with RN events overnight. Review of Systems:    Symptom Y/N Comments  Symptom Y/N Comments   Fever/Chills N   Chest Pain N    Poor Appetite N   Edema N    Cough N   Abdominal Pain N    Sputum N   Joint Pain N    SOB/MILLER N   Pruritis/Rash N    Nausea/vomit N   Tolerating PT/OT Y    Diarrhea N   Tolerating Diet Y    Constipation N   Other       Could NOT obtain due to:      Objective:      Physical Exam:    Last 24hrs VS reviewed since prior progress note. Most recent are:    Visit Vitals  BP (!) 133/59 (BP 1 Location: Left upper arm, BP Patient Position: At rest)   Pulse 61   Temp 98 °F (36.7 °C)   Resp 22   Ht 5' 3\" (1.6 m)   Wt 45.4 kg (100 lb)   SpO2 95%   BMI 17.71 kg/m²     No intake or output data in the 24 hours ending 04/23/22 1043     General Appearance: Elderly female, frail, alert; no acute distress. Ears/Nose/Mouth/Throat: Hearing grossly normal; moist mucous membranes  Neck: Supple. Chest: Lungs clear to auscultation bilaterally. Cardiovascular: Regular rate and rhythm, S1S2 normal, 3/6 systolic murmur. Abdomen: Soft, non-tender, bowel sounds are active. Extremities: No edema bilaterally. Skin: Warm and dry.     []         Post-cath site without hematoma, bruit, tenderness, or thrill. Distal pulses intact. PMH/ reviewed - no change compared to H&P    Data Review    Telemetry:     EKG:   []  No new EKG for review    Lab Data Personally Reviewed:    Recent Labs     04/23/22  0619 04/22/22  1053   WBC 7.9 12.4*   HGB 11.6 13.5   HCT 35.9 41.1   * 169     Recent Labs     04/23/22  0619 04/22/22  1639 04/22/22  1313   INR  --   --  1.1   PTP  --   --  14.5   APTT 42.9* 33.6  --       Recent Labs     04/23/22  0619 04/22/22  1053    136   K 3.6 4.6    103   CO2 31 30   BUN 25* 22*   CREA 0.63 0.83   * 138*   CA 8.4* 9.3     No results for input(s): CPK, CKNDX, TROIQ in the last 72 hours. No lab exists for component: CPKMB  No results found for: CHOL, CHOLX, CHLST, CHOLV, HDL, HDLP, LDL, LDLC, DLDLP, TGLX, TRIGL, TRIGP, CHHD, CHHDX    Recent Labs     04/22/22  1053   AP 76   TP 7.3   ALB 3.9   GLOB 3.4     No results for input(s): PH, PCO2, PO2 in the last 72 hours.     Medications Personally Reviewed:    Current Facility-Administered Medications   Medication Dose Route Frequency    dilTIAZem (CARDIZEM) 125 mg/125 mL (1 mg/mL) dextrose 5% infusion  5 mg/hr IntraVENous CONTINUOUS    sodium chloride (NS) flush 5-40 mL  5-40 mL IntraVENous Q8H    sodium chloride (NS) flush 5-40 mL  5-40 mL IntraVENous PRN    acetaminophen (TYLENOL) tablet 650 mg  650 mg Oral Q6H PRN    Or    acetaminophen (TYLENOL) suppository 650 mg  650 mg Rectal Q6H PRN    polyethylene glycol (MIRALAX) packet 17 g  17 g Oral DAILY PRN    ondansetron (ZOFRAN ODT) tablet 4 mg  4 mg Oral Q8H PRN    Or    ondansetron (ZOFRAN) injection 4 mg  4 mg IntraVENous Q6H PRN    aspirin delayed-release tablet 81 mg  81 mg Oral DAILY    cyanocobalamin tablet 1,000 mcg  1,000 mcg Oral DAILY    hydrocortisone (ALA-BREA) 1 % lotion 1 Bottle  1 Bottle Topical BID    levothyroxine (SYNTHROID) tablet 50 mcg  50 mcg Oral ACB    loratadine (CLARITIN) tablet 10 mg  10 mg Oral DAILY    metoprolol succinate (TOPROL-XL) XL tablet 25 mg  25 mg Oral DAILY    pantoprazole (PROTONIX) tablet 20 mg  20 mg Oral DAILY    furosemide (LASIX) injection 40 mg  40 mg IntraVENous DAILY    heparin 25,000 units in D5W 250 ml infusion  12-25 Units/kg/hr IntraVENous CONTINUOUS    heparin (porcine) 1,000 unit/mL injection 2,720 Units  60 Units/kg IntraVENous PRN    Or    heparin (porcine) 1,000 unit/mL injection 1,360 Units  30 Units/kg IntraVENous PRN         Олег Leong MD

## 2022-04-24 ENCOUNTER — APPOINTMENT (OUTPATIENT)
Dept: NON INVASIVE DIAGNOSTICS | Age: 87
DRG: 280 | End: 2022-04-24
Attending: INTERNAL MEDICINE
Payer: MEDICARE

## 2022-04-24 LAB
ALBUMIN SERPL-MCNC: 3.1 G/DL (ref 3.5–5)
ALBUMIN/GLOB SERPL: 1.1 {RATIO} (ref 1.1–2.2)
ALP SERPL-CCNC: 73 U/L (ref 45–117)
ALT SERPL-CCNC: 35 U/L (ref 12–78)
ANION GAP SERPL CALC-SCNC: 2 MMOL/L (ref 5–15)
AST SERPL W P-5'-P-CCNC: 21 U/L (ref 15–37)
ATRIAL RATE: 60 BPM
BILIRUB SERPL-MCNC: 0.6 MG/DL (ref 0.2–1)
BUN SERPL-MCNC: 23 MG/DL (ref 6–20)
BUN/CREAT SERPL: 43 (ref 12–20)
CA-I BLD-MCNC: 8.3 MG/DL (ref 8.5–10.1)
CALCULATED R AXIS, ECG10: -60 DEGREES
CALCULATED T AXIS, ECG11: 148 DEGREES
CHLORIDE SERPL-SCNC: 102 MMOL/L (ref 97–108)
CO2 SERPL-SCNC: 36 MMOL/L (ref 21–32)
CREAT SERPL-MCNC: 0.54 MG/DL (ref 0.55–1.02)
DIAGNOSIS, 93000: NORMAL
ECHO AO ROOT DIAM: 3 CM
ECHO AO ROOT INDEX: 2.08 CM/M2
ECHO AV AREA PEAK VELOCITY: 0.7 CM2
ECHO AV AREA VTI: 0.7 CM2
ECHO AV AREA/BSA PEAK VELOCITY: 0.5 CM2/M2
ECHO AV AREA/BSA VTI: 0.5 CM2/M2
ECHO AV MEAN GRADIENT: 62 MMHG
ECHO AV MEAN VELOCITY: 3.7 M/S
ECHO AV PEAK GRADIENT: 97 MMHG
ECHO AV PEAK VELOCITY: 4.9 M/S
ECHO AV VELOCITY RATIO: 0.22
ECHO AV VTI: 124 CM
ECHO EST RA PRESSURE: 8 MMHG
ECHO LA DIAMETER INDEX: 2.01 CM/M2
ECHO LA DIAMETER: 2.9 CM
ECHO LA TO AORTIC ROOT RATIO: 0.97
ECHO LV E' SEPTAL VELOCITY: 5 CM/S
ECHO LV FRACTIONAL SHORTENING: 39 % (ref 28–44)
ECHO LV INTERNAL DIMENSION DIASTOLE INDEX: 2.15 CM/M2
ECHO LV INTERNAL DIMENSION DIASTOLIC: 3.1 CM (ref 3.9–5.3)
ECHO LV INTERNAL DIMENSION SYSTOLIC INDEX: 1.32 CM/M2
ECHO LV INTERNAL DIMENSION SYSTOLIC: 1.9 CM
ECHO LV IVSD: 2.1 CM (ref 0.6–0.9)
ECHO LV MASS 2D: 249.1 G (ref 67–162)
ECHO LV MASS INDEX 2D: 173 G/M2 (ref 43–95)
ECHO LV POSTERIOR WALL DIASTOLIC: 1.7 CM (ref 0.6–0.9)
ECHO LV RELATIVE WALL THICKNESS RATIO: 1.1
ECHO LVOT AREA: 3.1 CM2
ECHO LVOT AV VTI INDEX: 0.21
ECHO LVOT DIAM: 2 CM
ECHO LVOT MEAN GRADIENT: 2 MMHG
ECHO LVOT PEAK GRADIENT: 5 MMHG
ECHO LVOT PEAK VELOCITY: 1.1 M/S
ECHO LVOT STROKE VOLUME INDEX: 56.7 ML/M2
ECHO LVOT SV: 81.6 ML
ECHO LVOT VTI: 26 CM
ECHO MV AREA VTI: 1.7 CM2
ECHO MV LVOT VTI INDEX: 1.87
ECHO MV MAX VELOCITY: 1.5 M/S
ECHO MV MEAN GRADIENT: 3 MMHG
ECHO MV MEAN VELOCITY: 0.8 M/S
ECHO MV PEAK GRADIENT: 8 MMHG
ECHO MV VTI: 48.6 CM
ECHO PVEIN A DURATION: 106 MS
ECHO PVEIN A VELOCITY: 0.3 M/S
ECHO RIGHT VENTRICULAR SYSTOLIC PRESSURE (RVSP): 47 MMHG
ECHO RV INTERNAL DIMENSION: 3.4 CM
ECHO TV REGURGITANT MAX VELOCITY: 3.12 M/S
ECHO TV REGURGITANT PEAK GRADIENT: 39 MMHG
ERYTHROCYTE [DISTWIDTH] IN BLOOD BY AUTOMATED COUNT: 13.1 % (ref 11.5–14.5)
GLOBULIN SER CALC-MCNC: 2.8 G/DL (ref 2–4)
GLUCOSE SERPL-MCNC: 99 MG/DL (ref 65–100)
HCT VFR BLD AUTO: 38.4 % (ref 35–47)
HGB BLD-MCNC: 12.3 G/DL (ref 11.5–16)
MCH RBC QN AUTO: 30.5 PG (ref 26–34)
MCHC RBC AUTO-ENTMCNC: 32 G/DL (ref 30–36.5)
MCV RBC AUTO: 95.3 FL (ref 80–99)
NRBC # BLD: 0 K/UL (ref 0–0.01)
NRBC BLD-RTO: 0 PER 100 WBC
P-R INTERVAL, ECG05: 226 MS
PLATELET # BLD AUTO: 153 K/UL (ref 150–400)
PMV BLD AUTO: 11.3 FL (ref 8.9–12.9)
POTASSIUM SERPL-SCNC: 3.5 MMOL/L (ref 3.5–5.1)
PROT SERPL-MCNC: 5.9 G/DL (ref 6.4–8.2)
Q-T INTERVAL, ECG07: 454 MS
QRS DURATION, ECG06: 90 MS
QTC CALCULATION (BEZET), ECG08: 454 MS
RBC # BLD AUTO: 4.03 M/UL (ref 3.8–5.2)
SODIUM SERPL-SCNC: 140 MMOL/L (ref 136–145)
VENTRICULAR RATE, ECG03: 60 BPM
WBC # BLD AUTO: 7.1 K/UL (ref 3.6–11)

## 2022-04-24 PROCEDURE — 74011250636 HC RX REV CODE- 250/636: Performed by: INTERNAL MEDICINE

## 2022-04-24 PROCEDURE — 85027 COMPLETE CBC AUTOMATED: CPT

## 2022-04-24 PROCEDURE — 74011250637 HC RX REV CODE- 250/637: Performed by: INTERNAL MEDICINE

## 2022-04-24 PROCEDURE — 80053 COMPREHEN METABOLIC PANEL: CPT

## 2022-04-24 PROCEDURE — 36415 COLL VENOUS BLD VENIPUNCTURE: CPT

## 2022-04-24 PROCEDURE — 74011000250 HC RX REV CODE- 250: Performed by: INTERNAL MEDICINE

## 2022-04-24 PROCEDURE — 65270000029 HC RM PRIVATE

## 2022-04-24 PROCEDURE — 93005 ELECTROCARDIOGRAM TRACING: CPT

## 2022-04-24 PROCEDURE — 93306 TTE W/DOPPLER COMPLETE: CPT

## 2022-04-24 RX ORDER — POTASSIUM CHLORIDE 1.5 G/1.77G
40 POWDER, FOR SOLUTION ORAL
Status: COMPLETED | OUTPATIENT
Start: 2022-04-24 | End: 2022-04-24

## 2022-04-24 RX ORDER — LANOLIN ALCOHOL/MO/W.PET/CERES
400 CREAM (GRAM) TOPICAL 2 TIMES DAILY
Status: DISCONTINUED | OUTPATIENT
Start: 2022-04-24 | End: 2022-04-25 | Stop reason: HOSPADM

## 2022-04-24 RX ORDER — MAGNESIUM SULFATE HEPTAHYDRATE 40 MG/ML
2 INJECTION, SOLUTION INTRAVENOUS ONCE
Status: COMPLETED | OUTPATIENT
Start: 2022-04-24 | End: 2022-04-24

## 2022-04-24 RX ORDER — LANOLIN ALCOHOL/MO/W.PET/CERES
400 CREAM (GRAM) TOPICAL 2 TIMES DAILY
Qty: 20 TABLET | Refills: 0 | Status: SHIPPED | OUTPATIENT
Start: 2022-04-24 | End: 2022-05-04

## 2022-04-24 RX ORDER — FUROSEMIDE 40 MG/1
20 TABLET ORAL DAILY
Qty: 30 TABLET | Refills: 0 | Status: SHIPPED | OUTPATIENT
Start: 2022-04-24 | End: 2022-05-24

## 2022-04-24 RX ADMIN — POTASSIUM CHLORIDE 40 MEQ: 1.5 POWDER, FOR SOLUTION ORAL at 11:43

## 2022-04-24 RX ADMIN — HYDROCORTISONE 1 BOTTLE: 1 LOTION TOPICAL at 09:28

## 2022-04-24 RX ADMIN — LORATADINE 10 MG: 10 TABLET ORAL at 09:22

## 2022-04-24 RX ADMIN — Medication 400 MG: at 20:25

## 2022-04-24 RX ADMIN — METOPROLOL SUCCINATE 25 MG: 25 TABLET, EXTENDED RELEASE ORAL at 09:25

## 2022-04-24 RX ADMIN — APIXABAN 2.5 MG: 2.5 TABLET, FILM COATED ORAL at 20:25

## 2022-04-24 RX ADMIN — PANTOPRAZOLE SODIUM 20 MG: 20 TABLET, DELAYED RELEASE ORAL at 09:22

## 2022-04-24 RX ADMIN — MAGNESIUM SULFATE HEPTAHYDRATE 2 G: 40 INJECTION, SOLUTION INTRAVENOUS at 11:44

## 2022-04-24 RX ADMIN — HYDROCORTISONE 1 BOTTLE: 1 LOTION TOPICAL at 20:27

## 2022-04-24 RX ADMIN — LEVOTHYROXINE SODIUM 50 MCG: 0.03 TABLET ORAL at 11:43

## 2022-04-24 RX ADMIN — APIXABAN 2.5 MG: 2.5 TABLET, FILM COATED ORAL at 09:23

## 2022-04-24 RX ADMIN — SODIUM CHLORIDE, PRESERVATIVE FREE 10 ML: 5 INJECTION INTRAVENOUS at 06:39

## 2022-04-24 RX ADMIN — SODIUM CHLORIDE, PRESERVATIVE FREE 10 ML: 5 INJECTION INTRAVENOUS at 20:26

## 2022-04-24 RX ADMIN — FUROSEMIDE 20 MG: 40 TABLET ORAL at 09:22

## 2022-04-24 RX ADMIN — Medication 400 MG: at 11:43

## 2022-04-24 NOTE — PROGRESS NOTES
Problem: Falls - Risk of  Goal: *Absence of Falls  Description: Document Charmaine Shahid Fall Risk and appropriate interventions in the flowsheet.   Outcome: Progressing Towards Goal  Note: Fall Risk Interventions:  Mobility Interventions: Bed/chair exit alarm,Patient to call before getting OOB         Medication Interventions: Bed/chair exit alarm,Patient to call before getting OOB    Elimination Interventions: Call light in reach,Bed/chair exit alarm              Problem: Patient Education: Go to Patient Education Activity  Goal: Patient/Family Education  Outcome: Progressing Towards Goal     Problem: Patient Education: Go to Patient Education Activity  Goal: Patient/Family Education  Outcome: Progressing Towards Goal

## 2022-04-24 NOTE — PROGRESS NOTES
Progress Note      4/24/2022 10:43 AM  NAME: Jerry Arzola   MRN:  [id number]   Admit Diagnosis: A-fib [facility]) [I48.91]          Assessment/Plan:     STEFFEN velasco with rapid ventricular response, Now with atrial paced rhythm. Anticoagulated with Eliquis. Discussed with patient risks and benefits of anticoagulation and she expresses agreement  to stay on anticoagulation to avoid strokes. NSTEMI without chest pain,? Type II, without peaking. With starting Eliquis did discontinue aspirin. Aortic valve disease               []       High complexity decision making was performed in this patient at high risk for decompensation with multiple organ involvement. Subjective:     Orestes Serrano denies chest pain, dyspnea. Discussed with RN events overnight. Review of Systems:    Symptom Y/N Comments  Symptom Y/N Comments   Fever/Chills N   Chest Pain N    Poor Appetite N   Edema N    Cough N   Abdominal Pain N    Sputum N   Joint Pain N    SOB/MILLER N   Pruritis/Rash N    Nausea/vomit N   Tolerating PT/OT Y    Diarrhea N   Tolerating Diet Y    Constipation N   Other       Could NOT obtain due to:      Objective:      Physical Exam:    Last 24hrs VS reviewed since prior progress note. Most recent are:    Visit Vitals  BP (!) 158/80 (BP Patient Position: At rest;Sitting)   Pulse 64   Temp 97.9 °F (36.6 °C)   Resp 20   Ht 5' 3\" (1.6 m)   Wt 45.4 kg (100 lb)   SpO2 95%   BMI 17.71 kg/m²     No intake or output data in the 24 hours ending 04/24/22 1045     General Appearance: Elderly female, frail, alert; no acute distress. Ears/Nose/Mouth/Throat: Hearing grossly normal; moist mucous membranes  Neck: Supple. Chest: Lungs clear to auscultation bilaterally. Cardiovascular: Regular rate and rhythm, S1S2 normal, 3/6 systolic murmur. Abdomen: Soft, non-tender, bowel sounds are active. Extremities: No edema bilaterally. Skin: Warm and dry. []         Post-cath site without hematoma, bruit, tenderness, or thrill. Distal pulses intact. PMH/ reviewed - no change compared to H&P    Data Review    Telemetry:     EKG:   []  No new EKG for review    Lab Data Personally Reviewed:    Recent Labs     04/24/22  0645 04/23/22  0619   WBC 7.1 7.9   HGB 12.3 11.6   HCT 38.4 35.9    141*     Recent Labs     04/23/22  0619 04/22/22  1639 04/22/22  1313   INR  --   --  1.1   PTP  --   --  14.5   APTT 42.9* 33.6  --       Recent Labs     04/24/22  0645 04/23/22  0619 04/22/22  1053    138 136   K 3.5 3.6 4.6    103 103   CO2 36* 31 30   BUN 23* 25* 22*   CREA 0.54* 0.63 0.83   GLU 99 108* 138*   CA 8.3* 8.4* 9.3     No results for input(s): CPK, CKNDX, TROIQ in the last 72 hours. No lab exists for component: CPKMB  No results found for: CHOL, CHOLX, CHLST, CHOLV, HDL, HDLP, LDL, LDLC, DLDLP, TGLX, TRIGL, TRIGP, CHHD, CHHDX    Recent Labs     04/24/22  0645 04/22/22  1053   AP 73 76   TP 5.9* 7.3   ALB 3.1* 3.9   GLOB 2.8 3.4     No results for input(s): PH, PCO2, PO2 in the last 72 hours.     Medications Personally Reviewed:    Current Facility-Administered Medications   Medication Dose Route Frequency    magnesium sulfate 2 g/50 ml IVPB (premix or compounded)  2 g IntraVENous ONCE    potassium chloride (KLOR-CON) packet for solution 40 mEq  40 mEq Oral NOW    magnesium oxide (MAG-OX) tablet 400 mg  400 mg Oral BID    [Held by provider] dilTIAZem ER (CARDIZEM CD) capsule 120 mg  120 mg Oral DAILY    apixaban (ELIQUIS) tablet 2.5 mg  2.5 mg Oral BID    furosemide (LASIX) tablet 20 mg  20 mg Oral DAILY    sodium chloride (NS) flush 5-40 mL  5-40 mL IntraVENous Q8H    sodium chloride (NS) flush 5-40 mL  5-40 mL IntraVENous PRN    acetaminophen (TYLENOL) tablet 650 mg  650 mg Oral Q6H PRN    Or    acetaminophen (TYLENOL) suppository 650 mg  650 mg Rectal Q6H PRN    polyethylene glycol (MIRALAX) packet 17 g  17 g Oral DAILY PRN    ondansetron (ZOFRAN ODT) tablet 4 mg  4 mg Oral Q8H PRN    Or    ondansetron [facility]) injection 4 mg  4 mg IntraVENous Q6H PRN    cyanocobalamin tablet 1,000 mcg  1,000 mcg Oral DAILY    hydrocortisone (ALA-BREA) 1 % lotion 1 Bottle  1 Bottle Topical BID    levothyroxine (SYNTHROID) tablet 50 mcg  50 mcg Oral ACB    loratadine (CLARITIN) tablet 10 mg  10 mg Oral DAILY    metoprolol succinate (TOPROL-XL) XL tablet 25 mg  25 mg Oral DAILY    pantoprazole (PROTONIX) tablet 20 mg  20 mg Oral DAILY         Marcella Tang MD

## 2022-04-24 NOTE — PROGRESS NOTES
Tele nocturnist note  -telemetry has reported a \"17 second pause\" in this patient who is 80 yrs old and DNR. She was on Cardizem drip previously.    -RN reports patient is asymptomatic with adequate BP  -obtain 12 lead EKG  -hold oral metoprolol and cardiazem

## 2022-04-24 NOTE — PROGRESS NOTES
Telemetry room called that patient had a 17 seconds pause. Dr Rosanna Chirinos was notified. MD ordered to put on hold PO Cardizem and metoprolol and to do a 12 lead EKG. Patient closely monitored and vital signs as documented in Epic.    0610 - 12 lead EKG done, went to monitor room to clarify initial call. Patient didn't have a pause, it was a wide complex rhythm. Dr Rosanna Chirinos updated.

## 2022-04-24 NOTE — DISCHARGE SUMMARY
Physician Discharge Summary     Patient ID:    Lucio Barrientos  [id number]  89 y.o.  9/3/1919    Admit date: 4/22/2022    Discharge date : 4/24/2022    Chronic Diagnoses:    Problem List as of 4/24/2022 Never Reviewed          Codes Class Noted - Resolved    A-fib [facility]) ICD-10-CM: I48.91  ICD-9-CM: 427.31  4/22/2022 - Present        Adjustment and management of cardiac pacemaker ICD-10-CM: Z45.018  ICD-9-CM: V53.31  10/1/2020 - Present          22    Final Diagnoses:   A-fib ([address] [I48.91]    Reason for Hospitalization:    Progressive shortness of breath for few days    Hospital Course:     80 y. o. female who presents with complaints of progressive shortness of breath for the past few days.  Notes intermittent chest palpitation without dizziness lightheadedness or falls.  Evaluated in the ED and noted with atrial fibrillation with rapid ventricular response.  Started on IV Cardizem with improvement in heart rate.  BNP was 16,000.  Chest x-ray with pulmonary edema. Treated with low-dose IV diuretics with improvement. Started on low-dose Eliquis for anticoagulation and stroke prevention. Stable for discharge to home with outpatient follow-up. Encouraged family to provide 24-hour care for patient at home. Discharge Medications:   Current Discharge Medication List      START taking these medications    Details   apixaban (ELIQUIS) 2.5 mg tablet Take 1 Tablet by mouth two (2) times a day for 30 days. Qty: 60 Tablet, Refills: 0  Start date: 4/24/2022, End date: 5/24/2022      furosemide (LASIX) 40 mg tablet Take 0.5 Tablets by mouth daily for 30 days. Qty: 30 Tablet, Refills: 0  Start date: 4/24/2022, End date: 5/24/2022      magnesium oxide (MAG-OX) 400 mg tablet Take 1 Tablet by mouth two (2) times a day for 10 days.   Qty: 20 Tablet, Refills: 0  Start date: 4/24/2022, End date: 5/4/2022         CONTINUE these medications which have NOT CHANGED    Details   pantoprazole (PROTONIX) 20 mg tablet Take 20 mg by mouth daily. hydrocortisone (CORTAID) 0.5 % topical cream Apply  to affected area two (2) times a day. use thin layer      loratadine 10 mg cap Take  by mouth.      cyanocobalamin 1,000 mcg tablet Take 1,000 mcg by mouth daily. metoprolol succinate (TOPROL-XL) 25 mg XL tablet Take 25 mg by mouth daily. aspirin delayed-release 81 mg tablet Take 81 mg by mouth daily. levothyroxine (synthroid) 25 mcg tablet Take 50 mcg by mouth Daily (before breakfast). Follow up Care:    1. Sonia Acosta MD in 1-2 weeks. Please call to set up an appointment shortly after discharge. Diet:  Cardiac Diet    Disposition:  Home. Advanced Directive:   FULL    DNR      Discharge Exam:  Visit Vitals  BP (!) 158/80 (BP Patient Position: At rest;Sitting)   Pulse 64   Temp 97.9 °F (36.6 °C)   Resp 20   Ht 5' 2.99\" (1.6 m)   Wt 45.4 kg (100 lb)   SpO2 95%   BMI 17.72 kg/m²    O2 Flow Rate (L/min): 2 l/min O2 Device: Nasal cannula    Temp (24hrs), Av °F (36.7 °C), Min:97.9 °F (36.6 °C), Max:98.2 °F (36.8 °C)    No intake/output data recorded. No intake/output data recorded. General:  Alert, cooperative, no distress, appears stated age. Lungs:   Clear to auscultation bilaterally. Chest wall:  No tenderness or deformity. Heart:  Regular rate and rhythm, S1, S2 normal, no murmur, click, rub or gallop. Abdomen:   Soft, non-tender. Bowel sounds normal. No masses,  No organomegaly. Extremities: Extremities normal, atraumatic, no cyanosis or edema. Pulses: 2+ and symmetric all extremities. Skin: Skin color, texture, turgor normal. No rashes or lesions   Neurologic: CNII-XII intact. No gross sensory or motor deficits         CONSULTATIONS: Cardiology    Significant Diagnostic Studies:   2022: BUN 22 mg/dL (H; Ref range: 6 - 20 mg/dL); Calcium 9.3 mg/dL (Ref range: 8.5 - 10.1 mg/dL); CO2 30 mmol/L (Ref range: 21 - 32 mmol/L);  Creatinine 0.83 mg/dL (Ref range: 0.55 - 1.02 mg/dL); Glucose 138 mg/dL (H; Ref range: 65 - 100 mg/dL); HCT 41.1 % (Ref range: 35.0 - 47.0 %); HGB 13.5 g/dL (Ref range: 11.5 - 16.0 g/dL); Potassium 4.6 mmol/L (Ref range: 3.5 - 5.1 mmol/L); Sodium 136 mmol/L (Ref range: 136 - 145 mmol/L)  4/23/2022: BUN 25 mg/dL (H; Ref range: 6 - 20 mg/dL); Calcium 8.4 mg/dL (L; Ref range: 8.5 - 10.1 mg/dL); CO2 31 mmol/L (Ref range: 21 - 32 mmol/L); Creatinine 0.63 mg/dL (Ref range: 0.55 - 1.02 mg/dL); Glucose 108 mg/dL (H; Ref range: 65 - 100 mg/dL); HCT 35.9 % (Ref range: 35.0 - 47.0 %); HGB 11.6 g/dL (Ref range: 11.5 - 16.0 g/dL); Potassium 3.6 mmol/L (Ref range: 3.5 - 5.1 mmol/L); Sodium 138 mmol/L (Ref range: 136 - 145 mmol/L)  Recent Labs     04/24/22  0645 04/23/22  0619   WBC 7.1 7.9   HGB 12.3 11.6   HCT 38.4 35.9    141*     Recent Labs     04/24/22  0645 04/23/22  0619 04/22/22  1053    138 136   K 3.5 3.6 4.6    103 103   CO2 36* 31 30   BUN 23* 25* 22*   CREA 0.54* 0.63 0.83   GLU 99 108* 138*   CA 8.3* 8.4* 9.3     Recent Labs     04/24/22  0645 04/22/22  1053   ALT 35 51   AP 73 76   TBILI 0.6 1.1*   TP 5.9* 7.3   ALB 3.1* 3.9   GLOB 2.8 3.4     Recent Labs     04/23/22  0619 04/22/22  1639 04/22/22  1313   INR  --   --  1.1   PTP  --   --  14.5   APTT 42.9* 33.6  --       No results for input(s): FE, TIBC, PSAT, FERR in the last 72 hours. No results for input(s): PH, PCO2, PO2 in the last 72 hours. No results for input(s): CPK, CKMB in the last 72 hours.     No lab exists for component: TROPONINI  No results found for: [facility]    Total Time: 35 minutes    Signed:  Hussein López MD  4/24/2022  11:28 AM

## 2022-04-24 NOTE — PROGRESS NOTES
Comprehensive Nutrition Assessment    Type and Reason for Visit: Positive nutrition screen (low BMI)    Nutrition Recommendations/Plan:   Continue current diet  Add ensure enlive 3x/day  Monitor and record PO intakes, supplement acceptance, and Bms in I/Os     Malnutrition Assessment:  Malnutrition Status:  No malnutrition (04/24/22 1051)    Context:  Acute illness     Findings of the 6 clinical characteristics of malnutrition:   Energy Intake:  No significant decrease in energy intake  Weight Loss:  No significant weight loss     Body Fat Loss:  Unable to assess,     Muscle Mass Loss:  Unable to assess,    Fluid Accumulation:  No significant fluid accumulation,     Strength:  Not performed     Nutrition Assessment:    Admitted for Afib, cardiac monitoring. Intakes good, no hx of recent weight loss. Labs: Na 140, K 3.5, BUN 23, Creat 0.54, Gluc 99, Alb 3.1. Meds: none nutritionally relevant. Nutrition Related Findings:    No n/v, d/c, or problems chewing/swallowing. BM 4/21. No edema. Wound Type:  (s/p pacemaker change)    Current Nutrition Intake & Therapies:  ADULT DIET Regular; Low Fat/Low Chol/High Fiber/2 gm Na    Anthropometric Measures:  Height: 5' 2.99\" (160 cm)  Ideal Body Weight (IBW): 115 lbs (52 kg)  Current Body Wt:  45.4 kg (100 lb), 87 % IBW.  Not specified  Current BMI (kg/m2): 17.7  Weight Adjustment: No adjustment  BMI Category: Underweight (BMI less than 22) age over 72    Estimated Daily Nutrient Needs:  Energy Requirements Based On: Kcal/kg  Weight Used for Energy Requirements: Current  Energy (kcal/day): 1589kcal (35kcal/kg - underweight)  Weight Used for Protein Requirements: Current  Protein (g/day): 45g (1g/kg)  Method Used for Fluid Requirements: 1 ml/kcal  Fluid (ml/day): 1589mL    Nutrition Diagnosis:   (P) Underweight related to (P) inadequate protein-energy intake as evidenced by (P) BMI    Nutrition Interventions:   Food and/or Nutrient Delivery: (P) Continue current diet,Start oral nutrition supplement  Nutrition Education/Counseling: (P) Education not indicated  Coordination of Nutrition Care: (P) Continue to monitor while inpatient    Goals:  Goals: (P) PO intake 50% or greater,within 7 days    Nutrition Monitoring and Evaluation:   Behavioral-Environmental Outcomes: (P) None identified  Food/Nutrient Intake Outcomes: (P) Food and nutrient intake,Supplement intake  Physical Signs/Symptoms Outcomes: (P) Meal time behavior,Weight    Discharge Planning:    (P) Too soon to determine    Hollis Son RD  Contact: [phone]

## 2022-04-24 NOTE — PROGRESS NOTES
Clinical chart reviewed by CM, noted discharge orders. Patient has been accepted by [facility]. Additionally, her respiratory therapist completed an oxygen saturation test, and she now requires oxygen to return home. CM sent referrals to [facility] via [employer], and called both companies. After being placed on hold for nearly a half hour, CM was informed that they are closed today and insurance authorization cannot be done until Monday morning. Dr. Marilee Ruiz made aware. Patient's daughter, Sarai Cash ([phone]) was notified, along with the patient. CM team will continue to follow.

## 2022-04-25 VITALS
OXYGEN SATURATION: 96 % | TEMPERATURE: 98.6 F | BODY MASS INDEX: 17.72 KG/M2 | SYSTOLIC BLOOD PRESSURE: 138 MMHG | HEART RATE: 61 BPM | HEIGHT: 63 IN | DIASTOLIC BLOOD PRESSURE: 74 MMHG | WEIGHT: 100 LBS | RESPIRATION RATE: 20 BRPM

## 2022-04-25 LAB
ANION GAP SERPL CALC-SCNC: 1 MMOL/L (ref 5–15)
BUN SERPL-MCNC: 22 MG/DL (ref 6–20)
BUN/CREAT SERPL: 46 (ref 12–20)
CA-I BLD-MCNC: 8.2 MG/DL (ref 8.5–10.1)
CHLORIDE SERPL-SCNC: 105 MMOL/L (ref 97–108)
CO2 SERPL-SCNC: 36 MMOL/L (ref 21–32)
CREAT SERPL-MCNC: 0.48 MG/DL (ref 0.55–1.02)
ERYTHROCYTE [DISTWIDTH] IN BLOOD BY AUTOMATED COUNT: 13.2 % (ref 11.5–14.5)
GLUCOSE SERPL-MCNC: 111 MG/DL (ref 65–100)
HCT VFR BLD AUTO: 39 % (ref 35–47)
HGB BLD-MCNC: 12.3 G/DL (ref 11.5–16)
MCH RBC QN AUTO: 30.6 PG (ref 26–34)
MCHC RBC AUTO-ENTMCNC: 31.5 G/DL (ref 30–36.5)
MCV RBC AUTO: 97 FL (ref 80–99)
NRBC # BLD: 0 K/UL (ref 0–0.01)
NRBC BLD-RTO: 0 PER 100 WBC
PLATELET # BLD AUTO: 159 K/UL (ref 150–400)
PMV BLD AUTO: 11.1 FL (ref 8.9–12.9)
POTASSIUM SERPL-SCNC: 4 MMOL/L (ref 3.5–5.1)
RBC # BLD AUTO: 4.02 M/UL (ref 3.8–5.2)
SODIUM SERPL-SCNC: 142 MMOL/L (ref 136–145)
WBC # BLD AUTO: 5.7 K/UL (ref 3.6–11)

## 2022-04-25 PROCEDURE — 36415 COLL VENOUS BLD VENIPUNCTURE: CPT

## 2022-04-25 PROCEDURE — 97161 PT EVAL LOW COMPLEX 20 MIN: CPT

## 2022-04-25 PROCEDURE — 74011000250 HC RX REV CODE- 250: Performed by: INTERNAL MEDICINE

## 2022-04-25 PROCEDURE — 85027 COMPLETE CBC AUTOMATED: CPT

## 2022-04-25 PROCEDURE — 97530 THERAPEUTIC ACTIVITIES: CPT

## 2022-04-25 PROCEDURE — 74011250637 HC RX REV CODE- 250/637: Performed by: INTERNAL MEDICINE

## 2022-04-25 PROCEDURE — 77010033678 HC OXYGEN DAILY

## 2022-04-25 PROCEDURE — 80048 BASIC METABOLIC PNL TOTAL CA: CPT

## 2022-04-25 PROCEDURE — 94761 N-INVAS EAR/PLS OXIMETRY MLT: CPT

## 2022-04-25 RX ADMIN — METOPROLOL SUCCINATE 25 MG: 25 TABLET, EXTENDED RELEASE ORAL at 08:32

## 2022-04-25 RX ADMIN — PANTOPRAZOLE SODIUM 20 MG: 20 TABLET, DELAYED RELEASE ORAL at 08:32

## 2022-04-25 RX ADMIN — LORATADINE 10 MG: 10 TABLET ORAL at 08:32

## 2022-04-25 RX ADMIN — CYANOCOBALAMIN TAB 1000 MCG 1000 MCG: 1000 TAB at 09:25

## 2022-04-25 RX ADMIN — LEVOTHYROXINE SODIUM 50 MCG: 0.03 TABLET ORAL at 08:32

## 2022-04-25 RX ADMIN — Medication 400 MG: at 08:32

## 2022-04-25 RX ADMIN — SODIUM CHLORIDE, PRESERVATIVE FREE 10 ML: 5 INJECTION INTRAVENOUS at 06:56

## 2022-04-25 RX ADMIN — APIXABAN 2.5 MG: 2.5 TABLET, FILM COATED ORAL at 08:32

## 2022-04-25 RX ADMIN — HYDROCORTISONE 1 BOTTLE: 1 LOTION TOPICAL at 08:34

## 2022-04-25 RX ADMIN — FUROSEMIDE 20 MG: 40 TABLET ORAL at 08:32

## 2022-04-25 NOTE — PROGRESS NOTES
Patient and family were given discharge instructctions and verbalized understanding. Patient was discharged with O2 tank on 2L, and walker, VSS and in no distress. Patient was wheeled downstatirs via wheelchair. Her ride home were her 2 daughters. Discharge plan of care/case management plan validated with provider discharge order.

## 2022-04-25 NOTE — PROGRESS NOTES
OT tx session attempted at 21 [phone], however pt pleasantly declining stating that she would like to rest prior to going home. Pt educated on importance of therapy but stating she would do ADLs when she got home and would just like to rest.  Will continue to follow pt and attempt tx session at a later time as time allows. Thank you.

## 2022-04-25 NOTE — DISCHARGE SUMMARY
Physician Discharge Summary     Patient ID:    Theron Powell  [id number]  89 y.o.  9/3/1919    Admit date: 4/22/2022    Discharge date : 4/25/2022    Chronic Diagnoses:    Problem List as of 4/25/2022 Never Reviewed          Codes Class Noted - Resolved    A-fib [facility]) ICD-10-CM: I48.91  ICD-9-CM: 427.31  4/22/2022 - Present        Adjustment and management of cardiac pacemaker ICD-10-CM: Z45.018  ICD-9-CM: V53.31  10/1/2020 - Present          22    Final Diagnoses:   A-fib ([address] [I48.91] with RVR  Acute diastolic heart failure   Critical aortic stenosis  Acute respiratory failure with hypoxia  Hypertension  Hyperlipidemia  Hypothyroidism  History of stroke  Permanent pacemaker in situ  History of breast cancer    Reason for Hospitalization:    Progressive shortness of breath for few days    Hospital Course:     80 y. o. female who presents with complaints of progressive shortness of breath for the past few days.  Notes intermittent chest palpitation without dizziness lightheadedness or falls.  Evaluated in the ED and noted with atrial fibrillation with rapid ventricular response.  Started on IV Cardizem with improvement in heart rate.  BNP was 16,000.  Chest x-ray with pulmonary edema. Treated with low-dose IV diuretics with improvement. Started on low-dose Eliquis for anticoagulation and stroke prevention. Stable for discharge to home with outpatient follow-up. Encouraged family to provide 24-hour care for patient at home. Her echo showed an EF of 70 to 75% with critical aortic stenosis 80 peak gradient of 97. Given her age she was not felt to be a candidate for TAVR    I discussed the findings of her critical aortic stenosis with her 2 daughters, along with poor prognosis.   I mentioned the possibility of hospice although they are not interested in this at this time    Patient qualified for home oxygen which could not be arranged on the weekend and therefore patient was kept until     We are anticipating discharge home today with home oxygen            Discharge Medications:   Current Discharge Medication List      START taking these medications    Details   apixaban (ELIQUIS) 2.5 mg tablet Take 1 Tablet by mouth two (2) times a day for 30 days. Qty: 60 Tablet, Refills: 0  Start date: 2022, End date: 2022      furosemide (LASIX) 40 mg tablet Take 0.5 Tablets by mouth daily for 30 days. Qty: 30 Tablet, Refills: 0  Start date: 2022, End date: 2022      magnesium oxide (MAG-OX) 400 mg tablet Take 1 Tablet by mouth two (2) times a day for 10 days. Qty: 20 Tablet, Refills: 0  Start date: 2022, End date: 2022         CONTINUE these medications which have NOT CHANGED    Details   pantoprazole (PROTONIX) 20 mg tablet Take 20 mg by mouth daily. hydrocortisone (CORTAID) 0.5 % topical cream Apply  to affected area two (2) times a day. use thin layer      loratadine 10 mg cap Take  by mouth.      cyanocobalamin 1,000 mcg tablet Take 1,000 mcg by mouth daily. metoprolol succinate (TOPROL-XL) 25 mg XL tablet Take 25 mg by mouth daily. aspirin delayed-release 81 mg tablet Take 81 mg by mouth daily. levothyroxine (synthroid) 25 mcg tablet Take 50 mcg by mouth Daily (before breakfast). Follow up Care:    1. Atilio Vo MD in 1-2 weeks. Please call to set up an appointment shortly after discharge. Diet:  Cardiac Diet    Disposition:  Home. Advanced Directive:   FULL    DNR      Discharge Exam:  Visit Vitals  BP (!) 161/84   Pulse 62   Temp 98.2 °F (36.8 °C)   Resp 18   Ht 5' 2.99\" (1.6 m)   Wt 45.4 kg (100 lb)   SpO2 96%   BMI 17.72 kg/m²    O2 Flow Rate (L/min): 2 l/min O2 Device: Nasal cannula    Temp (24hrs), Av.9 °F (36.6 °C), Min:97.8 °F (36.6 °C), Max:98.2 °F (36.8 °C)    No intake/output data recorded. No intake/output data recorded. General:  Alert, cooperative, no distress, appears stated age.    Lungs: Clear to auscultation bilaterally. Chest wall:  No tenderness or deformity. Heart:  Regular rate and rhythm, S1, S2 normal, 4/6 systolic ejection murmur, no click, rub or gallop. Abdomen:   Soft, non-tender. Bowel sounds normal. No masses,  No organomegaly. Extremities: Extremities normal, atraumatic, no cyanosis or edema. Pulses: 2+ and symmetric all extremities. Skin: Skin color, texture, turgor normal. No rashes or lesions   Neurologic: CNII-XII intact. No gross sensory or motor deficits         CONSULTATIONS: Cardiology    Significant Diagnostic Studies:   4/22/2022: BUN 22 mg/dL (H; Ref range: 6 - 20 mg/dL); Calcium 9.3 mg/dL (Ref range: 8.5 - 10.1 mg/dL); CO2 30 mmol/L (Ref range: 21 - 32 mmol/L); Creatinine 0.83 mg/dL (Ref range: 0.55 - 1.02 mg/dL); Glucose 138 mg/dL (H; Ref range: 65 - 100 mg/dL); HCT 41.1 % (Ref range: 35.0 - 47.0 %); HGB 13.5 g/dL (Ref range: 11.5 - 16.0 g/dL); Potassium 4.6 mmol/L (Ref range: 3.5 - 5.1 mmol/L); Sodium 136 mmol/L (Ref range: 136 - 145 mmol/L)  4/23/2022: BUN 25 mg/dL (H; Ref range: 6 - 20 mg/dL); Calcium 8.4 mg/dL (L; Ref range: 8.5 - 10.1 mg/dL); CO2 31 mmol/L (Ref range: 21 - 32 mmol/L); Creatinine 0.63 mg/dL (Ref range: 0.55 - 1.02 mg/dL); Glucose 108 mg/dL (H; Ref range: 65 - 100 mg/dL); HCT 35.9 % (Ref range: 35.0 - 47.0 %); HGB 11.6 g/dL (Ref range: 11.5 - 16.0 g/dL); Potassium 3.6 mmol/L (Ref range: 3.5 - 5.1 mmol/L);  Sodium 138 mmol/L (Ref range: 136 - 145 mmol/L)  Recent Labs     04/25/22  0622 04/24/22  0645   WBC 5.7 7.1   HGB 12.3 12.3   HCT 39.0 38.4    153     Recent Labs     04/25/22  0622 04/24/22  0645 04/23/22  0619    140 138   K 4.0 3.5 3.6    102 103   CO2 36* 36* 31   BUN 22* 23* 25*   CREA 0.48* 0.54* 0.63   * 99 108*   CA 8.2* 8.3* 8.4*     Recent Labs     04/24/22  0645 04/22/22  1053   ALT 35 51   AP 73 76   TBILI 0.6 1.1*   TP 5.9* 7.3   ALB 3.1* 3.9   GLOB 2.8 3.4     Recent Labs     04/23/22  7551 04/22/22  1639 04/22/22  1313   INR  --   --  1.1   PTP  --   --  14.5   APTT 42.9* 33.6  --       No results for input(s): FE, TIBC, PSAT, FERR in the last 72 hours. No results for input(s): PH, PCO2, PO2 in the last 72 hours. No results for input(s): CPK, CKMB in the last 72 hours.     No lab exists for component: TROPONINI  No results found for: GLUCPOC    Total Time: 35 minutes    Signed:  Norma Neumann MD  4/25/2022  11:28 AM

## 2022-04-25 NOTE — PROGRESS NOTES
4/25/2022, 10:50 AM        [facility] at [facility]  Phone: [phone]      Daily Progress Note:      Patient identification:     Venkat Modi  80 y. o.female  9/3/1919      Primary Cardiologist:  Dr. Antonella Gabriel    Chief Complaint: Aortic stenosis    Assessment:     1. Aortic stenosis with mean gradient of 62 mmHg and peak velocity of 4.9 m/s across the aortic valve. Aortic valve area calculated at 0.7 cm² by VTI. Patient does not wish to have any invasive cardiac procedures performed at this time including consideration of TAVR. 2. Atrial fibrillation: Maintained on reduced dose Eliquis. 3. NSTEMI likely type II  4. Prolonged 17-second pause noted on telemetry. Patient was asymptomatic. Beta-blocker and Cardizem were held. 5. Patient DNR. Recommendation:     1. Continue conservative medical therapy. 2. Patient does not desire any invasive cardiac procedures. 3. Heart rate will need to be followed. 4. Continue reduced dose Eliquis. 5. Maintain euvolemia. 6. Outpatient follow-up with primary cardiologist.    Interval History:  Echocardiogram was accomplished showing evidence of severe aortic stenosis. Subjective:  Pt. States she feels at her baseline. Review of Systems:   No angina. No worsening edema. No PND orthopnea. No syncope. No worsening shortness of breath.     Medications:     Current Facility-Administered Medications   Medication Dose Route Frequency    magnesium oxide (MAG-OX) tablet 400 mg  400 mg Oral BID    [Held by provider] dilTIAZem ER (CARDIZEM CD) capsule 120 mg  120 mg Oral DAILY    apixaban (ELIQUIS) tablet 2.5 mg  2.5 mg Oral BID    furosemide (LASIX) tablet 20 mg  20 mg Oral DAILY    sodium chloride (NS) flush 5-40 mL  5-40 mL IntraVENous Q8H    sodium chloride (NS) flush 5-40 mL  5-40 mL IntraVENous PRN    acetaminophen (TYLENOL) tablet 650 mg  650 mg Oral Q6H PRN    Or    acetaminophen (TYLENOL) suppository 650 mg  650 mg Rectal Q6H PRN    polyethylene glycol (MIRALAX) packet 17 g  17 g Oral DAILY PRN    ondansetron (ZOFRAN ODT) tablet 4 mg  4 mg Oral Q8H PRN    Or    ondansetron (ZOFRAN) injection 4 mg  4 mg IntraVENous Q6H PRN    cyanocobalamin tablet 1,000 mcg  1,000 mcg Oral DAILY    hydrocortisone (ALA-BREA) 1 % lotion 1 Bottle  1 Bottle Topical BID    levothyroxine (SYNTHROID) tablet 50 mcg  50 mcg Oral ACB    loratadine (CLARITIN) tablet 10 mg  10 mg Oral DAILY    metoprolol succinate (TOPROL-XL) XL tablet 25 mg  25 mg Oral DAILY    pantoprazole (PROTONIX) tablet 20 mg  20 mg Oral DAILY       Allergies:   No Known Allergies    Physical Exam:   Visit Vitals  BP (!) 161/84   Pulse 62   Temp 98.2 °F (36.8 °C)   Resp 18   Ht 5' 2.99\" (1.6 m)   Wt 45.4 kg (100 lb)   SpO2 96%   BMI 17.72 kg/m²       General:  NAD, calm, cooperative  CVS: Regular rate and rhythm, normal M1U3, 2/6 systolic, rubs or gallops  Pulm:  Normal effort, clear to auscultation bilaterally  Abd:  Soft, non-tender, non-distended  Ext:  Warm and well perfused without edema  Neuro:  Alert and oriented, answering questions appropriately    Telemetry reviewed: Sinus rhythm with 16 beat wide-complex tachycardia. Labs:    CBC  Lab Results   Component Value Date/Time    WBC 5.7 04/25/2022 06:22 AM    RBC 4.02 04/25/2022 06:22 AM    HCT 39.0 04/25/2022 06:22 AM    MCV 97.0 04/25/2022 06:22 AM    MCH 30.6 04/25/2022 06:22 AM    RDW 13.2 04/25/2022 06:22 AM    MONOS 13 04/22/2022 10:53 AM    EOS 0 04/22/2022 10:53 AM    BASOS 0 04/22/2022 10:53 AM       Basic Metabolic Profile  Lab Results   Component Value Date     04/25/2022    CO2 36 (H) 04/25/2022    BUN 22 (H) 04/25/2022       Diagnostic Studies:    Echo Results  (Last 48 hours)    None        XR CHEST SNGL V   Final Result   Hydrostatic edema suspected.             Dr. Lauran Aase, MD, [facility]

## 2022-04-25 NOTE — PROGRESS NOTES
PHYSICAL THERAPY EVALUATION  Patient: Jazmyne East (89 y.o. female)  Date: 4/25/2022  Primary Diagnosis: A-fib [facility]) [I48.91]        Precautions: falls      ASSESSMENT  This is a 79 y/o female who came to  [facility]  ED with c/o  SOB and chest pain, admitted on 04/22/22  for atrial fibrillation with RVR, possible new onset of heart failure. Pt with PMH of  HTN, sick sinus syndrome, high cholesterol, hypothyroid, anxiety, s/p pacemaker, breast cancer with R mastectomy, stroke, and a GI bleed. Pt received semi-supine in bed , agreeable for PT eval/tx . Pt is A& O x 4 ,  per pt report , she lives alone in a one-story home with 1 step through her garage with a L HR, was IND with ADLs (daughter lives close by)  and Mod I for mobility with use of SPC prior to admission . Based on the objective data described below, currently pt on 2L O2, presents with decreased strength , 3+/5 grossly in  b/l LE , balance deficits , generalized weakness ,decreased activity tolerance and increased need for assist with functional mobility ( needs mod I for rolling from side to side  , SBA for supine <>sit transfers , intact  static sitting balance , SBA for sit <>stand and bed<>chair transfers , good  static  standing balance with support , is able to ambulate - 25' with RW, SBA. Pt's SPO2 desat to 88% post ambulation, recovered back to 93% on rest  ) . Pt educated on pursed lip DBex's, activity pacing and energy conservation techniques with pt demonstrating and verbalizing understanding. Pt would benefit from continued skilled PT services to address current impairments and improve overall IND and safety with  functional transfers/mobility. Recommend d/c to home with HHPT and family care when medically appropriate . Current Level of Function Impacting Discharge (mobility/balance): Pt requires SBA for transfers and ambulation. Functional Outcome Measure:   The patient scored 19 on the AM PAC basic mobility outcome measure which is indicative of medium complexity. Other factors to consider for discharge: lives alone, family support        PLAN :  Recommendations and Planned Interventions: bed mobility training, transfer training, gait training, therapeutic exercises, neuromuscular re-education, patient and family training/education, and therapeutic activities      Frequency/Duration: Patient will be followed by physical therapy:  2-3x/week to address goals.     Recommendation for discharge: (in order for the patient to meet his/her long term goals)  Home with [address] [place] [address] and family care    This discharge recommendation:  Has been made in collaboration with the attending provider and/or case management    IF patient discharges home will need the following DME: rolling walker         SUBJECTIVE:   Patient stated  My daughter lives close by and keeps checking on me     OBJECTIVE DATA SUMMARY:   HISTORY:    Past Medical History:   Diagnosis Date    Anxiety     Breast cancer ([facility] [address] 1980    right mastectomy    GI bleed     High cholesterol     Hypertension     Hypothyroid     Pacemaker     Sick sinus syndrome ([facility] [address]     Stroke ([facility] [address]      Past Surgical History:   Procedure Laterality Date    HX COLONOSCOPY      HX ENDOSCOPY      HX HEMORRHOIDECTOMY      HX HYSTERECTOMY      HX PACEMAKER      HX PACEMAKER PLACEMENT      UT BREAST SURGERY PROCEDURE UNLISTED      right mastectomy    UT INS PM PLS GEN W/EXIST SINGLE LEAD N/A 10/1/2020    INSERT PPM SINGLE LEAD GEN ONLY performed by Cosme Gray MD at [address] [place] [address] factors and/or comorbidities impacting plan of care:     Home Situation  Home Environment: Private residence  # Steps to Enter: 1 (through garage)  Rails to [employer]: Yes  [employer] : Left  Wheelchair Ramp: No  One/Two Story Residence: One story  Living Alone: Yes  Support Systems: Child(jac),Other Family Member(s)  Patient Expects to be Discharged to[de-identified] Home with home health  Current DME Used/Available at Home: Cane, straight  Tub or Shower Type: Tub (Pt takes baths)    PLOF: Pt IND for ADLS/IADLS, mod I for mobility with SPC prior to admission. EXAMINATION/PRESENTATION/DECISION MAKING:   Critical Behavior:  Neurologic State: Alert  Orientation Level: Oriented X4  Cognition: Follows commands     Hearing: Auditory  Auditory Impairment: None  Skin:  intact where exposed    Range Of Motion:  AROM: Generally decreased, functional    Strength:    Strength: Generally decreased, functional    Tone & Sensation:   Tone: Normal    Sensation: Intact    Coordination:  Coordination: Generally decreased, functional    Functional Mobility:  Bed Mobility:  Rolling: Modified independent  Supine to Sit: Stand-by assistance; Additional time  Sit to Supine: Stand-by assistance; Additional time  Scooting: Stand-by assistance; Additional time  Transfers:  Sit to Stand: Stand-by assistance  Stand to Sit: Stand-by assistance        Bed to Chair: Stand-by assistance    Balance:   Sitting: Intact; Without support  Standing: Impaired; Without support  Standing - Static: Good;Constant support  Standing - Dynamic : Fair;Constant support  Ambulation/Gait Training:  Distance (ft): 25 Feet (ft)  Assistive Device: Walker, rolling;Gait belt  Ambulation - Level of Assistance: Stand-by assistance    Functional Measure:    [employer] AM-PAC 6 Clicks         Basic Mobility Inpatient Short Form  How much difficulty does the patient currently have. .. Unable A Lot A Little None   1. Turning over in bed (including adjusting bedclothes, sheets and blankets)? [] 1   [] 2   [] 3   [x] 4   2. Sitting down on and standing up from a chair with arms ( e.g., wheelchair, bedside commode, etc.)   [] 1   [] 2   [x] 3   [] 4   3. Moving from lying on back to sitting on the side of the bed? [] 1   [] 2   [x] 3   [] 4          How much help from another person does the patient currently need. .. Total A Lot A Little None   4.   Moving to and from a bed to a chair (including a wheelchair)? [] 1   [] 2   [x] 3   [] 4   5. Need to walk in hospital room? [] 1   [] 2   [x] 3   [] 4   6. Climbing 3-5 steps with a railing? [] 1   [] 2   [x] 3   [] 4   © , Trustees of [address] [place] [address] [address] [address], under license to [employer]. All rights reserved     Score:  Initial:19 Most Recent: X (Date: 22-- )   Interpretation of Tool:  Represents activities that are increasingly more difficult (i.e. Bed mobility, Transfers, Gait). Score 24 23 22-20 19-15 14-10 9-7 6   Modifier CH CI CJ CK CL CM CN          Physical Therapy Evaluation Charge Determination   History Examination Presentation Decision-Making   LOW Complexity : Zero comorbidities / personal factors that will impact the outcome / POC MEDIUM Complexity : 3 Standardized tests and measures addressing body structure, function, activity limitation and / or participation in recreation  MEDIUM Complexity : Evolving with changing characteristics  Other Functional Measure [facility] 6 medium complexity      Based on the above components, the patient evaluation is determined to be of the following complexity level: LOW     Pain Ratin/10    Activity Tolerance:   Poor, desaturates with exertion and requires oxygen, requires rest breaks, and observed SOB with activity  Please refer to the flowsheet for vital signs taken during this treatment. After treatment patient left in no apparent distress:   Supine in bed, Call bell within reach, and Caregiver / family present    COMMUNICATION/EDUCATION:   The patients plan of care was discussed with: Registered nurse. Fall prevention education was provided and the patient/caregiver indicated understanding. and Patient/family agree to work toward stated goals and plan of care. Problem: Mobility Impaired (Adult and Pediatric)  Goal: *Acute Goals and Plan of Care (Insert Text)  Description: Pt will be I with LE HEP in 7 days.   Pt will perform bed mobility with mod I in 7 days. Pt will perform transfers with mod I in 7 days. Pt will amb -100 feet with LRAD safely with mod I , SPO2 >90% in 7 days.    Outcome: Not Met       Thank you for this referral.  Juju Bowden   Time Calculation: 24 mins

## 2022-04-25 NOTE — PROGRESS NOTES
0825: Chart reviewed. CM has messaged both [facility] and [facility] via [employer] to determine their ability to supply oxygen and a rolling walker to the patient understanding discharge orders are in place. CM will f/u via phone if no response. [facility] has accepted patient. CM has provided update to them via [employer]. 0915: Suhail Friedman has accepted patient and will deliver oxygen and rolling walker to the room today. ETA pending response. Discharge summary/order attached in [employer] to [facility] and [facility].    7051: Giorgio Ellis rep is heading to [facility] with DME. 1030: Daughter in room notified of oxygen and walker being delivered. She asked that CM return when her sister arrives around 12-12:30p.     1200: CM made visit to patient's room. Nurse present. Patient understands she has discharge orders for this day. States her daughter, Ceci Valderrama went to  her daughter who came in from [place] and they will return to transport her home. Patient is aware of home oxygen and walker being present in room. CM reminded patient [facility] will see her for [facility] services. Patient verbalized understanding. Updates attached in [employer] to [facility] and [facility].    Medicare pt has received, reviewed, and signed 2nd IM letter informing them of their right to appeal the discharge. Signed copied has been placed on pt bedside chart. 1300: CM made visit to room to answer any questions daughters may have. Family not present. CM provided HH and DME contact information in writing to the patient. 1315: CM met with patient and daughters, Ceci Valderrama and Cathi Mckeon. Both verbalized understanding of oxygen and walker being in room as well as contact info for [facility] and [facility] at bedside. Cathi Mckeon inquired about hospice recs via doc. CM explained [facility] also provided hospice services and asked if they would like an info sessions for future planning. Both daughters stated they would and CM shared she would request such from [facility].  Request made via [employer] message. Discharge plan of care/case management plan validated with provider discharge order.

## 2022-04-25 NOTE — PROGRESS NOTES
Problem: Falls - Risk of  Goal: *Absence of Falls  Description: Document Avelino Amaral Fall Risk and appropriate interventions in the flowsheet.   Outcome: Progressing Towards Goal  Note: Fall Risk Interventions:  Mobility Interventions: Bed/chair exit alarm         Medication Interventions: Bed/chair exit alarm    Elimination Interventions: Bed/chair exit alarm,Call light in reach              Problem: Patient Education: Go to Patient Education Activity  Goal: Patient/Family Education  Outcome: Progressing Towards Goal     Problem: Patient Education: Go to Patient Education Activity  Goal: Patient/Family Education  Outcome: Progressing Towards Goal

## 2022-04-28 LAB
BACTERIA SPEC CULT: NORMAL
SPECIAL REQUESTS,SREQ: NORMAL

## (undated) DEVICE — SUTURE VCRL + SZ 4-0 L27IN ABSRB UD PS-2 3/8 CIR REV CUT VCP426H

## (undated) DEVICE — PACER PACK: Brand: MEDLINE INDUSTRIES, INC.

## (undated) DEVICE — PLASMABLADE PS210-030S 3.0S LOCK: Brand: PLASMABLADE™

## (undated) DEVICE — CONTAINER,SPECIMEN,PNEU TUBE,4OZ,OR STRL: Brand: MEDLINE

## (undated) DEVICE — DRAPE EQUIP C ARM 74X42 IN MOB XR W/ TIE RUBBER BND LF

## (undated) DEVICE — SUT VCRL + 2-0 36IN CT1 UD --

## (undated) DEVICE — REM POLYHESIVE ADULT PATIENT RETURN ELECTRODE: Brand: VALLEYLAB

## (undated) DEVICE — 3M™ IOBAN™ 2 ANTIMICROBIAL INCISE DRAPE 6650EZ: Brand: IOBAN™ 2

## (undated) DEVICE — TUBING, SUCTION, 3/16" X 10', SCALLOP: Brand: MEDLINE

## (undated) DEVICE — DRESSING HEMSTAT W4XL4IN 4 PLY WHT IMPREG KAOLIN HYDRPHLC